# Patient Record
Sex: FEMALE | Race: WHITE | NOT HISPANIC OR LATINO | Employment: OTHER | URBAN - METROPOLITAN AREA
[De-identification: names, ages, dates, MRNs, and addresses within clinical notes are randomized per-mention and may not be internally consistent; named-entity substitution may affect disease eponyms.]

---

## 2017-05-31 PROBLEM — R94.39 ABNORMAL STRESS TEST: Status: ACTIVE | Noted: 2017-05-31

## 2017-08-02 PROBLEM — R94.39 ABNORMAL STRESS TEST: Status: RESOLVED | Noted: 2017-05-31 | Resolved: 2017-08-02

## 2019-01-17 ENCOUNTER — TELEPHONE (OUTPATIENT)
Dept: NEUROLOGY | Facility: CLINIC | Age: 69
End: 2019-01-17

## 2019-01-17 NOTE — TELEPHONE ENCOUNTER
----- Message from Ashley Cain sent at 1/17/2019  1:59 PM CST -----  Type:  Sooner Apoointment Request    Caller is requesting a sooner appointment.  Caller declined first available appointment listed below.  Caller will not accept being placed on the waitlist and is requesting a message be sent to doctor.    Name of Caller:  Patient  When is the first available appointment?  None and darrick ran out  Symptoms:  Dizziness  Best Call Back Number:  [phone]  Additional Information:  Was referred to office by [facility]. Please call to advise.

## 2019-01-25 ENCOUNTER — TELEPHONE (OUTPATIENT)
Dept: NEUROLOGY | Facility: CLINIC | Age: 69
End: 2019-01-25

## 2019-01-25 NOTE — TELEPHONE ENCOUNTER
----- Message from Beatris Lopez sent at 1/25/2019  4:01 PM CST -----  Contact: Amelie with [facility] - Dr. Cullen  Type: Needs Medical Advice    Who Called:  Amelie with Dr. Cullen's office ([facility])    Best Call Back Number: [phone]  Additional Information: Dr. Cullen would like patient seen by Dr. Montes within the next 2 weeks, if possible.  Patient currently has an appointment scheduled on March 21, 2019.  Caller states that Dr. Cullen's notes are available on Epic if Dr. Montes would like to review them.    Please call to advise  Thank you

## 2019-01-25 NOTE — TELEPHONE ENCOUNTER
Called the pt, unable to reach, there was a cancellation and I  was able to schedule her for Feb 5th at 8:00 am. I sent her a message through the pt portal informed of the date and time.

## 2019-02-05 ENCOUNTER — OFFICE VISIT (OUTPATIENT)
Dept: NEUROLOGY | Facility: CLINIC | Age: 69
End: 2019-02-05
Payer: MEDICARE

## 2019-02-05 VITALS
RESPIRATION RATE: 18 BRPM | SYSTOLIC BLOOD PRESSURE: 145 MMHG | BODY MASS INDEX: 30.07 KG/M2 | HEIGHT: 62 IN | HEART RATE: 67 BPM | WEIGHT: 163.38 LBS | DIASTOLIC BLOOD PRESSURE: 70 MMHG

## 2019-02-05 DIAGNOSIS — Q21.12 PFO (PATENT FORAMEN OVALE): ICD-10-CM

## 2019-02-05 DIAGNOSIS — E78.00 HYPERCHOLESTEROLEMIA: ICD-10-CM

## 2019-02-05 DIAGNOSIS — I10 ESSENTIAL HYPERTENSION: ICD-10-CM

## 2019-02-05 DIAGNOSIS — I63.411 CEREBROVASCULAR ACCIDENT (CVA) DUE TO EMBOLISM OF RIGHT MIDDLE CEREBRAL ARTERY: Primary | ICD-10-CM

## 2019-02-05 DIAGNOSIS — E11.65 TYPE 2 DIABETES MELLITUS WITH HYPERGLYCEMIA, WITHOUT LONG-TERM CURRENT USE OF INSULIN: ICD-10-CM

## 2019-02-05 DIAGNOSIS — Z86.73 HISTORY OF CVA (CEREBROVASCULAR ACCIDENT): ICD-10-CM

## 2019-02-05 PROCEDURE — 99205 OFFICE O/P NEW HI 60 MIN: CPT | Mod: S$PBB,,, | Performed by: PSYCHIATRY & NEUROLOGY

## 2019-02-05 PROCEDURE — 99205 PR OFFICE/OUTPT VISIT, NEW, LEVL V, 60-74 MIN: ICD-10-PCS | Mod: S$PBB,,, | Performed by: PSYCHIATRY & NEUROLOGY

## 2019-02-05 PROCEDURE — 99999 PR PBB SHADOW E&M-EST. PATIENT-LVL IV: ICD-10-PCS | Mod: PBBFAC,,, | Performed by: PSYCHIATRY & NEUROLOGY

## 2019-02-05 PROCEDURE — 99999 PR PBB SHADOW E&M-EST. PATIENT-LVL IV: CPT | Mod: PBBFAC,,, | Performed by: PSYCHIATRY & NEUROLOGY

## 2019-02-05 PROCEDURE — 99214 OFFICE O/P EST MOD 30 MIN: CPT | Mod: PBBFAC,PN | Performed by: PSYCHIATRY & NEUROLOGY

## 2019-02-05 RX ORDER — NAPROXEN SODIUM 220 MG/1
81 TABLET, FILM COATED ORAL DAILY
Refills: 0 | COMMUNITY
Start: 2019-02-05 | End: 2021-07-02

## 2019-02-05 NOTE — PATIENT INSTRUCTIONS
-Continue on the plavix 75 mg daily for now, add aspirin 81 mg daily (baby aspirin)    -Blood work to look for plavix resistance  -MRA of the brain and neck to look at your blood vessels  -Repeat echocardiogram of the heart
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
## 2019-02-05 NOTE — LETTER
February 5, 2019      [facility]  [address] [facility] [address]           [place] [facility]  [address] [facility] [address] [place] [address]  Phone: [phone]  Fax: [phone]          Patient: Anne Roger   MR Number: [id number]   YOB: 1950   Date of Visit: 2/5/2019       Dear [facility]:    Thank you for referring Anne Roger to me for evaluation. Attached you will find relevant portions of my assessment and plan of care.    If you have questions, please do not hesitate to call me. I look forward to following Anne Roger along with you.    Sincerely,    Jackelin Montes MD    Enclosure  CC:  No Recipients    If you would like to receive this communication electronically, please contact [email or web address] or [phone] to request more information on [employer] access.    For providers and/or their staff who would like to refer a patient to [facility], please contact us through our one-stop-shop provider referral line, Murphy Santiago, at [phone].    If you feel you have received this communication in error or would no longer like to receive these types of communications, please e-mail [email or web address]

## 2019-02-08 ENCOUNTER — TELEPHONE (OUTPATIENT)
Dept: NEUROLOGY | Facility: CLINIC | Age: 69
End: 2019-02-08

## 2019-02-08 ENCOUNTER — CLINICAL SUPPORT (OUTPATIENT)
Dept: CARDIOLOGY | Facility: CLINIC | Age: 69
End: 2019-02-08
Attending: PSYCHIATRY & NEUROLOGY
Payer: MEDICARE

## 2019-02-08 ENCOUNTER — LAB VISIT (OUTPATIENT)
Dept: LAB | Facility: HOSPITAL | Age: 69
End: 2019-02-08
Attending: PSYCHIATRY & NEUROLOGY
Payer: MEDICARE

## 2019-02-08 VITALS
HEART RATE: 65 BPM | WEIGHT: 163 LBS | BODY MASS INDEX: 30 KG/M2 | HEIGHT: 62 IN | SYSTOLIC BLOOD PRESSURE: 118 MMHG | DIASTOLIC BLOOD PRESSURE: 60 MMHG

## 2019-02-08 DIAGNOSIS — I63.411 CEREBROVASCULAR ACCIDENT (CVA) DUE TO EMBOLISM OF RIGHT MIDDLE CEREBRAL ARTERY: ICD-10-CM

## 2019-02-08 DIAGNOSIS — Z86.73 HISTORY OF CVA (CEREBROVASCULAR ACCIDENT): Primary | ICD-10-CM

## 2019-02-08 LAB
ASCENDING AORTA: 3.27 CM
BSA FOR ECHO PROCEDURE: 1.8 M2
CV ECHO LV RWT: 0.46 CM
DOP CALC LVOT AREA: 3.87 CM2
DOP CALC LVOT DIAMETER: 2.22 CM
DOP CALC LVOT PEAK VEL: 0.86 M/S
DOP CALC LVOT STROKE VOLUME: 68.36 CM3
DOP CALCLVOT PEAK VEL VTI: 17.67 CM
E WAVE DECELERATION TIME: 166.9 MSEC
E/A RATIO: 0.76
E/E' RATIO: 16.25
ECHO LV POSTERIOR WALL: 0.89 CM (ref 0.6–1.1)
FRACTIONAL SHORTENING: 29 % (ref 28–44)
INTERVENTRICULAR SEPTUM: 1.02 CM (ref 0.6–1.1)
IVRT: 0.08 MSEC
LA MAJOR: 4.33 CM
LA MINOR: 3.91 CM
LA WIDTH: 3.06 CM
LEFT ATRIUM SIZE: 3.54 CM
LEFT ATRIUM VOLUME INDEX: 21.6 ML/M2
LEFT ATRIUM VOLUME: 37.84 CM3
LEFT INTERNAL DIMENSION IN SYSTOLE: 2.75 CM (ref 2.1–4)
LEFT VENTRICLE DIASTOLIC VOLUME INDEX: 36.72 ML/M2
LEFT VENTRICLE DIASTOLIC VOLUME: 64.34 ML
LEFT VENTRICLE MASS INDEX: 64.2 G/M2
LEFT VENTRICLE SYSTOLIC VOLUME INDEX: 16.1 ML/M2
LEFT VENTRICLE SYSTOLIC VOLUME: 28.18 ML
LEFT VENTRICULAR INTERNAL DIMENSION IN DIASTOLE: 3.86 CM (ref 3.5–6)
LEFT VENTRICULAR MASS: 112.58 G
LV LATERAL E/E' RATIO: 13
LV SEPTAL E/E' RATIO: 21.67
MV PEAK A VEL: 0.85 M/S
MV PEAK E VEL: 0.65 M/S
PISA TR MAX VEL: 2.64 M/S
PULM VEIN S/D RATIO: 1.5
PV PEAK D VEL: 0.4 M/S
PV PEAK S VEL: 0.6 M/S
RA MAJOR: 4.49 CM
RA PRESSURE: 3 MMHG
RA WIDTH: 2.62 CM
RIGHT VENTRICULAR END-DIASTOLIC DIMENSION: 2.77 CM
SINUS: 2.72 CM
STJ: 3.11 CM
TDI LATERAL: 0.05
TDI SEPTAL: 0.03
TDI: 0.04
TR MAX PG: 27.88 MMHG
TRICUSPID ANNULAR PLANE SYSTOLIC EXCURSION: 2.01 CM
TV REST PULMONARY ARTERY PRESSURE: 31 MMHG

## 2019-02-08 PROCEDURE — 99999 PR PBB SHADOW E&M-EST. PATIENT-LVL II: ICD-10-PCS | Mod: PBBFAC,,,

## 2019-02-08 PROCEDURE — 93306 TRANSTHORACIC ECHO (TTE) COMPLETE (CUPID ONLY): ICD-10-PCS | Mod: 26,S$PBB,, | Performed by: INTERNAL MEDICINE

## 2019-02-08 PROCEDURE — 99212 OFFICE O/P EST SF 10 MIN: CPT | Mod: PBBFAC,PO,25

## 2019-02-08 PROCEDURE — 93306 TTE W/DOPPLER COMPLETE: CPT | Mod: PBBFAC,PO | Performed by: INTERNAL MEDICINE

## 2019-02-08 PROCEDURE — 99999 PR PBB SHADOW E&M-EST. PATIENT-LVL II: CPT | Mod: PBBFAC,,,

## 2019-02-08 NOTE — TELEPHONE ENCOUNTER
----- Message from Hedy Vargas sent at 2/8/2019  9:26 AM CST -----  Contact: Patient  Patient is calling to speak with the office regarding her Plavix lab that she was trying to have done this morning.  The lab told her that they did not have a kit to do it.  She is trying to find out if she can go to the [facility] to have it done and needs the orders put back in the system.  Please call patient to discuss.  Call Back#[phone]  Thanks

## 2019-02-11 ENCOUNTER — TELEPHONE (OUTPATIENT)
Dept: NEUROLOGY | Facility: CLINIC | Age: 69
End: 2019-02-11

## 2019-02-11 NOTE — TELEPHONE ENCOUNTER
----- Message from Toni Higgins sent at 2/11/2019 12:51 PM CST -----  Contact: Patient  Type: Needs Medical Advice    Who Called:  Patient  Best Call Back Number: [phone]  Additional Information: Patient needs to schedule Platelet Response Plavix lab ordered by Jackelin Montes. Please advise patient where she can have this done and schedule lab for her

## 2019-02-12 ENCOUNTER — PATIENT MESSAGE (OUTPATIENT)
Dept: NEUROLOGY | Facility: CLINIC | Age: 69
End: 2019-02-12

## 2019-02-19 ENCOUNTER — LAB VISIT (OUTPATIENT)
Dept: LAB | Facility: HOSPITAL | Age: 69
End: 2019-02-19
Attending: PSYCHIATRY & NEUROLOGY
Payer: MEDICARE

## 2019-02-19 DIAGNOSIS — Z86.73 HISTORY OF CVA (CEREBROVASCULAR ACCIDENT): ICD-10-CM

## 2019-02-19 LAB — PLATELET RESPONSE PLAVIX: 149 PRU

## 2019-02-19 PROCEDURE — 36415 COLL VENOUS BLD VENIPUNCTURE: CPT

## 2019-02-19 PROCEDURE — 85576 BLOOD PLATELET AGGREGATION: CPT

## 2019-02-25 ENCOUNTER — HOSPITAL ENCOUNTER (OUTPATIENT)
Dept: RADIOLOGY | Facility: HOSPITAL | Age: 69
Discharge: HOME | End: 2019-02-25
Attending: PSYCHIATRY & NEUROLOGY
Payer: MEDICARE

## 2019-02-25 DIAGNOSIS — I63.411 CEREBROVASCULAR ACCIDENT (CVA) DUE TO EMBOLISM OF RIGHT MIDDLE CEREBRAL ARTERY: ICD-10-CM

## 2019-02-25 PROCEDURE — 70549 MR ANGIOGRAPH NECK W/O&W/DYE: CPT | Mod: 26,,, | Performed by: RADIOLOGY

## 2019-02-25 PROCEDURE — 70544 MR ANGIOGRAPHY HEAD W/O DYE: CPT | Mod: TC,PO

## 2019-02-25 PROCEDURE — 70544 MRA BRAIN WITHOUT CONTRAST: ICD-10-PCS | Mod: 26,,, | Performed by: RADIOLOGY

## 2019-02-25 PROCEDURE — 70544 MR ANGIOGRAPHY HEAD W/O DYE: CPT | Mod: 26,,, | Performed by: RADIOLOGY

## 2019-02-25 PROCEDURE — A9577 INJ MULTIHANCE: HCPCS | Mod: PO | Performed by: PSYCHIATRY & NEUROLOGY

## 2019-02-25 PROCEDURE — 70549 MRA NECK WITH AND WITHOUT: ICD-10-PCS | Mod: 26,,, | Performed by: RADIOLOGY

## 2019-02-25 PROCEDURE — 70549 MR ANGIOGRAPH NECK W/O&W/DYE: CPT | Mod: TC,PO

## 2019-02-25 PROCEDURE — 25500020 PHARM REV CODE 255: Mod: PO | Performed by: PSYCHIATRY & NEUROLOGY

## 2019-02-25 RX ADMIN — GADOBENATE DIMEGLUMINE 14 ML: 529 INJECTION, SOLUTION INTRAVENOUS at 09:02

## 2019-03-01 ENCOUNTER — OFFICE VISIT (OUTPATIENT)
Dept: NEUROLOGY | Facility: CLINIC | Age: 69
End: 2019-03-01
Payer: MEDICARE

## 2019-03-01 VITALS
HEART RATE: 69 BPM | RESPIRATION RATE: 20 BRPM | SYSTOLIC BLOOD PRESSURE: 139 MMHG | WEIGHT: 165 LBS | HEIGHT: 62 IN | DIASTOLIC BLOOD PRESSURE: 60 MMHG | BODY MASS INDEX: 30.36 KG/M2

## 2019-03-01 DIAGNOSIS — I10 ESSENTIAL HYPERTENSION: ICD-10-CM

## 2019-03-01 DIAGNOSIS — Z86.73 HISTORY OF STROKE: Primary | ICD-10-CM

## 2019-03-01 DIAGNOSIS — Q21.12 PFO (PATENT FORAMEN OVALE): ICD-10-CM

## 2019-03-01 DIAGNOSIS — E11.65 TYPE 2 DIABETES MELLITUS WITH HYPERGLYCEMIA, WITHOUT LONG-TERM CURRENT USE OF INSULIN: ICD-10-CM

## 2019-03-01 DIAGNOSIS — E78.00 HYPERCHOLESTEROLEMIA: ICD-10-CM

## 2019-03-01 PROCEDURE — 99999 PR PBB SHADOW E&M-EST. PATIENT-LVL III: CPT | Mod: PBBFAC,,, | Performed by: PSYCHIATRY & NEUROLOGY

## 2019-03-01 PROCEDURE — 99213 OFFICE O/P EST LOW 20 MIN: CPT | Mod: PBBFAC,PN | Performed by: PSYCHIATRY & NEUROLOGY

## 2019-03-01 PROCEDURE — 99214 PR OFFICE/OUTPT VISIT, EST, LEVL IV, 30-39 MIN: ICD-10-PCS | Mod: S$PBB,,, | Performed by: PSYCHIATRY & NEUROLOGY

## 2019-03-01 PROCEDURE — 99999 PR PBB SHADOW E&M-EST. PATIENT-LVL III: ICD-10-PCS | Mod: PBBFAC,,, | Performed by: PSYCHIATRY & NEUROLOGY

## 2019-03-01 PROCEDURE — 99214 OFFICE O/P EST MOD 30 MIN: CPT | Mod: S$PBB,,, | Performed by: PSYCHIATRY & NEUROLOGY
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
## 2019-03-12 ENCOUNTER — TELEPHONE (OUTPATIENT)
Dept: NEUROLOGY | Facility: CLINIC | Age: 69
End: 2019-03-12

## 2019-03-12 NOTE — TELEPHONE ENCOUNTER
Spoke with pt and notified her of Dr. Montes medication recommendations; pt verbalized understanding.

## 2019-03-12 NOTE — TELEPHONE ENCOUNTER
Platelet response to plavix is low indicating it is not as effective for her for stroke prevention. Recommend she continue on aspirin 81 mg daily and discontinue the plavix

## 2019-06-20 ENCOUNTER — OFFICE VISIT (OUTPATIENT)
Dept: NEUROLOGY | Facility: CLINIC | Age: 69
End: 2019-06-20
Payer: MEDICARE

## 2019-06-20 VITALS
RESPIRATION RATE: 20 BRPM | SYSTOLIC BLOOD PRESSURE: 147 MMHG | HEART RATE: 62 BPM | HEIGHT: 62 IN | DIASTOLIC BLOOD PRESSURE: 67 MMHG | BODY MASS INDEX: 30.12 KG/M2 | WEIGHT: 163.69 LBS

## 2019-06-20 DIAGNOSIS — Q21.12 PFO (PATENT FORAMEN OVALE): ICD-10-CM

## 2019-06-20 DIAGNOSIS — I10 ESSENTIAL HYPERTENSION: ICD-10-CM

## 2019-06-20 DIAGNOSIS — Z78.9 PLAVIX RESISTANCE: ICD-10-CM

## 2019-06-20 DIAGNOSIS — Z86.73 HISTORY OF TIA (TRANSIENT ISCHEMIC ATTACK): ICD-10-CM

## 2019-06-20 DIAGNOSIS — Z86.73 HISTORY OF CVA (CEREBROVASCULAR ACCIDENT): Primary | ICD-10-CM

## 2019-06-20 DIAGNOSIS — E78.00 HYPERCHOLESTEROLEMIA: ICD-10-CM

## 2019-06-20 PROCEDURE — 99214 PR OFFICE/OUTPT VISIT, EST, LEVL IV, 30-39 MIN: ICD-10-PCS | Mod: S$PBB,,, | Performed by: PSYCHIATRY & NEUROLOGY

## 2019-06-20 PROCEDURE — 99214 OFFICE O/P EST MOD 30 MIN: CPT | Mod: S$PBB,,, | Performed by: PSYCHIATRY & NEUROLOGY

## 2019-06-20 PROCEDURE — 99999 PR PBB SHADOW E&M-EST. PATIENT-LVL III: CPT | Mod: PBBFAC,,, | Performed by: PSYCHIATRY & NEUROLOGY

## 2019-06-20 PROCEDURE — 99213 OFFICE O/P EST LOW 20 MIN: CPT | Mod: PBBFAC,PN | Performed by: PSYCHIATRY & NEUROLOGY

## 2019-06-20 PROCEDURE — 99999 PR PBB SHADOW E&M-EST. PATIENT-LVL III: ICD-10-PCS | Mod: PBBFAC,,, | Performed by: PSYCHIATRY & NEUROLOGY
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
## 2019-12-05 ENCOUNTER — TELEPHONE (OUTPATIENT)
Dept: NEUROLOGY | Facility: CLINIC | Age: 69
End: 2019-12-05

## 2019-12-05 NOTE — TELEPHONE ENCOUNTER
Spoke with patient and gave message from Dr. Montes.         Per Dr. Montes -   The migraine could have been related to the high blood pressure. She should work with her PCP for BP control and the compazine is fine to take if she does get another migraine. If the migraines are becoming more and more frequent (occurring more than two days per week), we can talk about other treatment options.

## 2019-12-05 NOTE — TELEPHONE ENCOUNTER
----- Message from Jessica Hope sent at 12/5/2019  8:54 AM CST -----  Type:  Same Day Appointment Request    Caller is requesting a same day appointment.      Name of Caller: Patient  When is the first available appointment?  NA  Symptoms:  Headache  Best Call Back Number:  [phone]  Additional Information:   Went to [facility] Emergency room last night/diagnosed with high blood pressure and migraine

## 2019-12-05 NOTE — TELEPHONE ENCOUNTER
Spoke with patient. She stated she went to ER last night and was diagnosed with high BP and migraine. No current migraine symptoms. Stated they gave her medication in the hospital and sent her home with a prescription for Compazine 10mg which she is going to  today. Explained to patient that Dr. Montes is not in clinic and that she could not be scheduled to see someone today. She stated she is fine not following up right now but would like advice on where to go from here if not following up in clinic soon. Please advise.

## 2020-12-21 ENCOUNTER — PATIENT MESSAGE (OUTPATIENT)
Dept: OTHER | Facility: OTHER | Age: 70
End: 2020-12-21

## 2021-01-06 ENCOUNTER — PATIENT MESSAGE (OUTPATIENT)
Dept: FAMILY MEDICINE | Facility: CLINIC | Age: 71
End: 2021-01-06

## 2021-01-07 ENCOUNTER — PATIENT MESSAGE (OUTPATIENT)
Dept: ADMINISTRATIVE | Facility: OTHER | Age: 71
End: 2021-01-07

## 2021-01-09 ENCOUNTER — IMMUNIZATION (OUTPATIENT)
Dept: FAMILY MEDICINE | Facility: CLINIC | Age: 71
End: 2021-01-09
Payer: MEDICARE

## 2021-01-09 DIAGNOSIS — Z23 NEED FOR VACCINATION: ICD-10-CM

## 2021-01-09 PROCEDURE — 91300 COVID-19, MRNA, LNP-S, PF, 30 MCG/0.3 ML DOSE VACCINE: CPT | Mod: PBBFAC,PO

## 2021-01-30 ENCOUNTER — IMMUNIZATION (OUTPATIENT)
Dept: FAMILY MEDICINE | Facility: CLINIC | Age: 71
End: 2021-01-30
Payer: MEDICARE

## 2021-01-30 DIAGNOSIS — Z23 NEED FOR VACCINATION: Primary | ICD-10-CM

## 2021-01-30 PROCEDURE — 91300 COVID-19, MRNA, LNP-S, PF, 30 MCG/0.3 ML DOSE VACCINE: CPT | Mod: PBBFAC,PO

## 2021-01-30 PROCEDURE — 0002A COVID-19, MRNA, LNP-S, PF, 30 MCG/0.3 ML DOSE VACCINE: CPT | Mod: PBBFAC,PO

## 2021-06-30 ENCOUNTER — OFFICE VISIT (OUTPATIENT)
Dept: OBSTETRICS AND GYNECOLOGY | Facility: CLINIC | Age: 71
End: 2021-06-30
Payer: MEDICARE

## 2021-06-30 VITALS — DIASTOLIC BLOOD PRESSURE: 62 MMHG | SYSTOLIC BLOOD PRESSURE: 95 MMHG | BODY MASS INDEX: 29.63 KG/M2 | WEIGHT: 162 LBS

## 2021-06-30 DIAGNOSIS — Z01.419 ENCOUNTER FOR ANNUAL ROUTINE GYNECOLOGICAL EXAMINATION: Primary | ICD-10-CM

## 2021-06-30 PROCEDURE — G0101 PR CA SCREEN;PELVIC/BREAST EXAM: ICD-10-PCS | Mod: S$PBB,,, | Performed by: OBSTETRICS & GYNECOLOGY

## 2021-06-30 PROCEDURE — G0101 CA SCREEN;PELVIC/BREAST EXAM: HCPCS | Mod: S$PBB,,, | Performed by: OBSTETRICS & GYNECOLOGY

## 2021-06-30 PROCEDURE — 99213 OFFICE O/P EST LOW 20 MIN: CPT | Mod: PBBFAC,PN | Performed by: OBSTETRICS & GYNECOLOGY

## 2021-06-30 PROCEDURE — 99999 PR PBB SHADOW E&M-EST. PATIENT-LVL III: ICD-10-PCS | Mod: PBBFAC,,, | Performed by: OBSTETRICS & GYNECOLOGY

## 2021-06-30 PROCEDURE — 99999 PR PBB SHADOW E&M-EST. PATIENT-LVL III: CPT | Mod: PBBFAC,,, | Performed by: OBSTETRICS & GYNECOLOGY

## 2021-06-30 RX ORDER — ALENDRONATE SODIUM 70 MG/1
70 TABLET ORAL
Qty: 12 TABLET | Refills: 4 | Status: SHIPPED | OUTPATIENT
Start: 2021-06-30 | End: 2022-08-15

## 2021-07-01 ENCOUNTER — TELEPHONE (OUTPATIENT)
Dept: OBSTETRICS AND GYNECOLOGY | Facility: CLINIC | Age: 71
End: 2021-07-01

## 2021-10-07 ENCOUNTER — IMMUNIZATION (OUTPATIENT)
Dept: FAMILY MEDICINE | Facility: CLINIC | Age: 71
End: 2021-10-07
Payer: MEDICARE

## 2021-10-07 DIAGNOSIS — Z23 NEED FOR VACCINATION: Primary | ICD-10-CM

## 2021-10-07 PROCEDURE — 0003A COVID-19, MRNA, LNP-S, PF, 30 MCG/0.3 ML DOSE VACCINE: CPT | Mod: PBBFAC | Performed by: FAMILY MEDICINE

## 2021-10-07 PROCEDURE — 91300 COVID-19, MRNA, LNP-S, PF, 30 MCG/0.3 ML DOSE VACCINE: CPT | Mod: PBBFAC,PO

## 2021-12-31 ENCOUNTER — PATIENT MESSAGE (OUTPATIENT)
Dept: ADMINISTRATIVE | Facility: OTHER | Age: 71
End: 2021-12-31
Payer: MEDICARE

## 2022-02-18 ENCOUNTER — PATIENT OUTREACH (OUTPATIENT)
Dept: ADMINISTRATIVE | Facility: HOSPITAL | Age: 72
End: 2022-02-18
Payer: MEDICARE

## 2022-02-18 NOTE — PROGRESS NOTES
[facility] chart audits-DEPRESSION SCREEN/FOLLOW UP PLAN.    SCREENING:  NO PHQ9 SCREENING FOR MEASUREMENT PERIOD.

## 2022-08-18 ENCOUNTER — TELEPHONE (OUTPATIENT)
Dept: ORTHOPEDICS | Facility: CLINIC | Age: 72
End: 2022-08-18
Payer: MEDICARE

## 2022-08-18 PROBLEM — M18.12 PRIMARY OSTEOARTHRITIS OF FIRST CARPOMETACARPAL JOINT OF LEFT HAND: Status: ACTIVE | Noted: 2022-08-18

## 2022-08-18 PROBLEM — E11.59 TYPE 2 DIABETES MELLITUS WITH CIRCULATORY DISORDER, WITHOUT LONG-TERM CURRENT USE OF INSULIN: Status: ACTIVE | Noted: 2022-08-18

## 2022-08-19 ENCOUNTER — TELEPHONE (OUTPATIENT)
Dept: ORTHOPEDICS | Facility: CLINIC | Age: 72
End: 2022-08-19
Payer: MEDICARE

## 2022-08-30 ENCOUNTER — TELEPHONE (OUTPATIENT)
Dept: OBSTETRICS AND GYNECOLOGY | Facility: CLINIC | Age: 72
End: 2022-08-30
Payer: MEDICARE

## 2022-08-30 NOTE — TELEPHONE ENCOUNTER
----- Message from Marquise Nguyễn MD sent at 8/30/2022  8:30 AM CDT -----  DEXA shows osteopenia with slight increase in bone density.  Continue with Fosamax as long as improving

## 2022-09-12 ENCOUNTER — HOSPITAL ENCOUNTER (OUTPATIENT)
Dept: RADIOLOGY | Facility: HOSPITAL | Age: 72
Discharge: HOME | End: 2022-09-12
Attending: ORTHOPAEDIC SURGERY
Payer: MEDICARE

## 2022-09-12 ENCOUNTER — OFFICE VISIT (OUTPATIENT)
Dept: ORTHOPEDICS | Facility: CLINIC | Age: 72
End: 2022-09-12
Payer: MEDICARE

## 2022-09-12 DIAGNOSIS — M18.12 PRIMARY OSTEOARTHRITIS OF FIRST CARPOMETACARPAL JOINT OF LEFT HAND: ICD-10-CM

## 2022-09-12 PROCEDURE — 99203 OFFICE O/P NEW LOW 30 MIN: CPT | Mod: S$PBB,,, | Performed by: ORTHOPAEDIC SURGERY

## 2022-09-12 PROCEDURE — 73130 X-RAY EXAM OF HAND: CPT | Mod: 26,LT,, | Performed by: RADIOLOGY

## 2022-09-12 PROCEDURE — 73130 X-RAY EXAM OF HAND: CPT | Mod: TC,PO,LT

## 2022-09-12 PROCEDURE — 99203 PR OFFICE/OUTPT VISIT, NEW, LEVL III, 30-44 MIN: ICD-10-PCS | Mod: S$PBB,,, | Performed by: ORTHOPAEDIC SURGERY

## 2022-09-12 PROCEDURE — 99213 OFFICE O/P EST LOW 20 MIN: CPT | Mod: PBBFAC,PN | Performed by: ORTHOPAEDIC SURGERY

## 2022-09-12 PROCEDURE — 99999 PR PBB SHADOW E&M-EST. PATIENT-LVL III: ICD-10-PCS | Mod: PBBFAC,,, | Performed by: ORTHOPAEDIC SURGERY

## 2022-09-12 PROCEDURE — 73130 XR HAND COMPLETE 3 VIEW LEFT: ICD-10-PCS | Mod: 26,LT,, | Performed by: RADIOLOGY

## 2022-09-12 PROCEDURE — 99999 PR PBB SHADOW E&M-EST. PATIENT-LVL III: CPT | Mod: PBBFAC,,, | Performed by: ORTHOPAEDIC SURGERY

## 2022-09-12 NOTE — PROGRESS NOTES
"9/12/2022    Chief Complaint:  Chief Complaint   Patient presents with    Left Hand - Pain     Decreased strength    Arthritis       HPI:  Anne Roger is a 71 y.o. female, who presents to clinic today she has a history of pain at the base of her left thumb.  She states that this has been going on for months.  It is gradually getting worse.  She is noticing decrease in her  strength.  She has not had any recent injuries.  She has no other complaints currently.    PMHX:  Past Medical History:   Diagnosis Date    a Coronary Artery Disease     Dr. Pepe Lisa; 5/31/17 LHC/Angiogram = (See Report); On Medical Management Only    a Patent Foramen Ovale With Atrial Septal Aneurym ####    Dr. Pepe Lisa; Dr. Jackelin Montes; In 2016 Her Insurance Denied Coverage For Her Recommended PFO Closure Procedure    b Hypertension     1/18/19 Increased Losartan 12.5 Mg To 25 Mg Daily    c Hypercholesterolemia     d Type 2 Diabetes Mellitus     12/13/19 Referred To Dr. Radha Dietz; 6/10/19 Increased Metformin-XR To 750 Mg BID; On Metformin- Mg BID And Amaryl 2 Mg BID    e Hypothyroidism     f Osteopenia (Improved From Previous Osteoporosis)     On Fosamax 70 Mg qWeek, And OTC D3 2K IU Daily, And OTC CaCO3 500 Mg BID    i SANAM With Hypersomnia     Dr. Chari Caal; Patient Prefers A Dental Appliance    j H/O Adenomatous Colon Polyp On Previous TC ###    Dr. Donn Lopez (After Her 8/19/19 TC Was Free Of Polyps): "Repeat TC In 5 Yrs"    k Nocturia     09/2014 Referred To Dr. Kyler Eller     l Bilateral Plantar Fasciitis     Dr. Charan Godoy ([facility]) Last Saw Her On 1/4/17    l Hallux Limitus Right Foot > Left Foot     Dr. Charan Godoy ([facility]) Last Saw Her On 1/4/17    l Left Shoulder Arthropathy     l Left Thumb Base Osteoarthritis     8/18/22 Referred To Dr. Jhonathan Nance; 8/18/22 RXd Voltaren Topical    l Right Shoulder Pain With Inflammation On 11/14 MRI     Dr. Jess Fox; Received PT For This 11/2014    m Bilateral " Leg Paresthesias ###    Dr. Jackelin Montes; 1/22/19 Brain MRI W/O Contrast = (See Report); 2/25/19 Brain And Neck MRA = Unremarkable (See Reports)    m Chronic Fatigue     10/17/16 CBC, TFTs (On Synthroid), Vitamin B12 Level, And Vitamin D3 Level = Normal;  imporving    m H/O CVA From PFO 04/2014 With No Sequelae ###    Dr. Jackelin Montes; Dr. Naranjo (Neurology); In 2016 Her Insurance Denied Coverage For Her Recommended PFO Closure Procedure    m Migraines     Dr. Jackelin Montes; Infrequent    n Chronic Depression     o Recurrent Dizziness Episodes ###    Dr. Jackelin Montes; 1/22/19 Brain MRI W/O Contrast = (See Report); 2/25/19 Brain And Neck MRA = Unremarkable (See Reports)    o Recurrent sinusitis     1/18/19 Referred To Dr. Perez Somers    p Early Bilateral Cataracts     Dr. 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 MORAIMA Marroquin    q BLE Varicose Veins     q Borderline Vitamin D Deficiency     12/13/19 RXd OTC D3 2K IU Daily    Wellness Visit 8/18/2022        PSHX:  Past Surgical History:   Procedure Laterality Date    DILATION AND CURETTAGE OF UTERUS      HYSTERECTOMY      OOPHORECTOMY      TONSILLECTOMY         FMHX:  Family History   Problem Relation Age of Onset    Depression Other     Diabetes Other     Thyroid disease Other     Lung cancer Other     Diabetes Mother     Blindness Mother     Endocrine tumor Mother     Lung cancer Father     Cancer Father         lung and brain    Anxiety disorder Sister     Deafness Sister     Deafness Brother     Cancer Sister         bladder    Valvular heart disease Sister        SOCHX:  Social History     Tobacco Use    Smoking status: Never    Smokeless tobacco: Never   Substance Use Topics    Alcohol use: Yes     Alcohol/week: 1.0 standard drink     Types: 1 Glasses of wine per week     Comment: occasionally       ALLERGIES:  Codeine, Plavix [clopidogrel], and Ramipril    CURRENT MEDICATIONS:  Current Outpatient Medications on File Prior to Visit   Medication Sig  Dispense Refill    alendronate (FOSAMAX) 70 MG tablet TAKE 1 TABLET BY MOUTH EVERY 7 DAYS 12 tablet 0    aspirin (ECOTRIN) 81 MG EC tablet Take 1 tablet (81 mg total) by mouth once daily.  0    atorvastatin (LIPITOR) 40 MG tablet Take 1 tablet (40 mg total) by mouth every evening. 90 tablet 3    blood sugar diagnostic (CONTOUR NEXT TEST STRIPS) Strp 1 each by Other route before breakfast. For diagnosis code E11.65 100 each 3    calcium-vitamin D3 (OS-JAYLIN 500 + D3) 500 mg(1,250mg) -200 unit per tablet Take 1 tablet by mouth 2 (two) times daily with meals. OSCAL 500/200 D-3 500-200 MG-UNIT TABS      cholecalciferol, vitamin D3, (VITAMIN D3) 2,000 unit Tab Take 1 tablet (2,000 Units total) by mouth once daily.      diabetic supplies, miscellan. Misc MICROLET LANCETS MISC      diclofenac sodium (VOLTAREN) 1 % Gel Apply 2 g topically 3 (three) times daily as needed. 300 g 3    fluorouraciL (EFUDEX) 5 % cream APPLY TOPICALLY TO AFFECTED AREA ON THE RIGHT ARM 1 TO 2 TIMES PER DAY AS TOLERATED      FOLIC ACID/MULTIVIT-MIN/LUTEIN (CENTRUM SILVER ORAL) Take 1 tablet by mouth nightly.       glimepiride (AMARYL) 2 MG tablet TAKE 1 & 1/2 (ONE & ONE-HALF) TABLETS BY MOUTH TWICE DAILY 270 tablet 3    GLUC HCL/GLUC HUGHES/YC-ZBC-W-GLUC (GLUCOSAMINE COMPLEX ORAL) Take 1 tablet by mouth once daily at 6am.       KELP ORAL Take 1 tablet by mouth once daily at 6am. KELP TABS      lancets (MICROLET LANCET) Misc USE 1  TO CHECK GLUCOSE TWICE DAILY 100 each 3    levothyroxine (SYNTHROID) 50 MCG tablet Take 1 tablet (50 mcg total) by mouth every morning. 90 tablet 3    losartan (COZAAR) 100 MG tablet Take 1 tablet (100 mg total) by mouth once daily. 90 tablet 3    magnesium 30 mg Tab Take 1 capsule by mouth nightly. MAGNESIUM TABS      metFORMIN (GLUCOPHAGE-XR) 750 MG ER 24hr tablet Take 1 tablet (750 mg total) by mouth 2 (two) times daily. 180 tablet 3    mupirocin (BACTROBAN) 2 % ointment Apply topically 2 (two) times daily as needed. 30 g 0      No current facility-administered medications on file prior to visit.       REVIEW OF SYSTEMS:  Review of Systems   Constitutional:  Negative for diaphoresis and fever.   HENT:  Negative for ear pain, hearing loss, nosebleeds and tinnitus.    Eyes:  Negative for pain and redness.   Respiratory:  Negative for cough and shortness of breath.    Cardiovascular:  Negative for chest pain and palpitations.   Gastrointestinal:  Negative for blood in stool, constipation, diarrhea, nausea and vomiting.   Genitourinary:  Positive for frequency. Negative for dysuria and hematuria.   Musculoskeletal:  Negative for back pain and myalgias.   Skin:  Negative for itching and rash.   Neurological:  Positive for weakness. Negative for dizziness, tingling, seizures and headaches.   Endo/Heme/Allergies:  Negative for environmental allergies.   Psychiatric/Behavioral:  The patient is nervous/anxious.      GENERAL PHYSICAL EXAM:   There were no vitals taken for this visit.   GEN: well developed, well nourished, no acute distress   HENT: Normocephalic, atraumatic   EYES: No discharge, conjunctiva normal   NECK: Supple, non-tender   PULM: No wheezing, no respiratory distress   CV: RRR   ABD: Soft, non-tender    ORTHO EXAM:   Examination of the left hand and wrist reveals that there is no edema.  There are no skin changes.  Palpation produces mild tenderness over the CMC joint.  Has a mildly to moderately positive grind test.  She is able to flex and extend all fingers.  She does have sensation intact in the median radial ulnar distribution.  Capillary refill is less than 2 seconds.    RADIOLOGY:   X-rays of the left hand were taken in clinic today.  The films reviewed by me.  There is noted to be mild to moderate degenerative change about the thumb CMC joint.  There is no other fracture dislocation or pathology    ASSESSMENT:   Left thumb mild-to-moderate CMC arthritis    PLAN:  1. She does have a prescription for topical  anti-inflammatory.  She has not started taking it yet but she would like to try that 1st    2.  Will provide her with the information for a meta  splint    3.  Follow-up with me on a p.r.n. basis     Answers submitted by the patient for this visit:  Orthopedics Questionnaire (Submitted on 9/11/2022)  unexpected weight change: No  appetite change : No  sleep disturbance: No  IMMUNOCOMPROMISED: No  nervous/ anxious: Yes  dysphoric mood: No  rash: No  visual disturbance: No  eye redness: No  eye pain: No  ear pain: No  tinnitus: No  hearing loss: No  sinus pressure : No  nosebleeds: No  enviro allergies: No  food allergies: No  cough: No  shortness of breath: No  sweating: No  dysuria: No  frequency: Yes  difficulty urinating: No  hematuria: No  painful intercourse: Yes  chest pain: No  palpitations: No  nausea: No  vomiting: No  diarrhea: No  blood in stool: No  constipation: No  headaches: No  dizziness: No  numbness: No  seizures: No  joint swelling: No  myalgia: No  weakness: Yes  back pain: No   (Submitted on 9/11/2022)  Chief Complaint: Hand injury  Pain Chronicity: recurrent  History of trauma: No  Onset: more than 1 month ago  Frequency: intermittently  Progression since onset: unchanged  injury location: at home  pain- numeric: 5/10  pain location: left hand  pain quality: dull, generalized  Radiating Pain: No  Aggravating factors: activity, flexion  fever: No  inability to bear weight: Yes  itching: No  joint locking: No  limited range of motion: Yes  stiffness: No  tingling: No  Treatments tried: exercise, movement, OTC pain meds  physical therapy: not tried  Improvement on treatment: no relief

## 2022-12-16 ENCOUNTER — TELEPHONE (OUTPATIENT)
Dept: UROLOGY | Facility: CLINIC | Age: 72
End: 2022-12-16
Payer: MEDICARE

## 2022-12-16 ENCOUNTER — PATIENT MESSAGE (OUTPATIENT)
Dept: OBSTETRICS AND GYNECOLOGY | Facility: CLINIC | Age: 72
End: 2022-12-16
Payer: MEDICARE

## 2022-12-16 PROBLEM — N81.10 BLADDER PROLAPSE, FEMALE, ACQUIRED: Status: ACTIVE | Noted: 2022-12-16

## 2022-12-16 NOTE — TELEPHONE ENCOUNTER
----- Message from Coreen Marion sent at 12/16/2022 12:37 PM CST -----  Regarding: Appt Access  Contact: self [phone]  Pt is calling to schedule a NP appt.  Referral is in the system.  Next avail is 01/06/2023.  Pt would like to be seen sooner for Bladder prolapse, female, acquired [N81.10].  Please call.

## 2023-01-09 ENCOUNTER — OFFICE VISIT (OUTPATIENT)
Dept: UROLOGY | Facility: CLINIC | Age: 73
End: 2023-01-09
Payer: MEDICARE

## 2023-01-09 VITALS
SYSTOLIC BLOOD PRESSURE: 114 MMHG | BODY MASS INDEX: 28.71 KG/M2 | WEIGHT: 156 LBS | HEIGHT: 62 IN | HEART RATE: 79 BPM | DIASTOLIC BLOOD PRESSURE: 67 MMHG

## 2023-01-09 DIAGNOSIS — N81.10 BLADDER PROLAPSE, FEMALE, ACQUIRED: ICD-10-CM

## 2023-01-09 PROCEDURE — 51701 PR INSERTION OF NON-INDWELLING BLADDER CATHETERIZATION FOR RESIDUAL UR: ICD-10-PCS | Mod: S$PBB,,, | Performed by: UROLOGY

## 2023-01-09 PROCEDURE — 81002 URINALYSIS NONAUTO W/O SCOPE: CPT | Mod: PBBFAC | Performed by: UROLOGY

## 2023-01-09 PROCEDURE — 99204 OFFICE O/P NEW MOD 45 MIN: CPT | Mod: S$PBB,25,, | Performed by: UROLOGY

## 2023-01-09 PROCEDURE — 99999 PR PBB SHADOW E&M-EST. PATIENT-LVL IV: CPT | Mod: PBBFAC,,, | Performed by: UROLOGY

## 2023-01-09 PROCEDURE — 99999 PR PBB SHADOW E&M-EST. PATIENT-LVL IV: ICD-10-PCS | Mod: PBBFAC,,, | Performed by: UROLOGY

## 2023-01-09 PROCEDURE — 99214 OFFICE O/P EST MOD 30 MIN: CPT | Mod: PBBFAC | Performed by: UROLOGY

## 2023-01-09 PROCEDURE — 51701 INSERT BLADDER CATHETER: CPT | Mod: PBBFAC | Performed by: UROLOGY

## 2023-01-09 PROCEDURE — 51701 INSERT BLADDER CATHETER: CPT | Mod: S$PBB,,, | Performed by: UROLOGY

## 2023-01-09 PROCEDURE — 99204 PR OFFICE/OUTPT VISIT, NEW, LEVL IV, 45-59 MIN: ICD-10-PCS | Mod: S$PBB,25,, | Performed by: UROLOGY

## 2023-01-09 NOTE — PROGRESS NOTES
CHIEF COMPLAINT:    Mrs. Roger is a 72 y.o. female presenting for a consultation at the request of Nadira Garcia NP. Patient presents with cystocele.    PRESENTING ILLNESS:    Anne Roger is a 72 y.o. female who is presents with a history of several bladder infections but also has a cystocele.  She has been told by her OBGYN that there was a cystocele present for the past 2 years.  She is bothered by it as she can feel it when it is low.  Sometimes pushes back in when she is in the shower.  She does not need to splint to urinate or defecate.  She has frequency x 8-9, nocturia x 3-4, rarely x 1.  She leans forward to fully empty her bladder.  During her visit, she remarked several times that she did not wish to have surgery on the [facility] as she did not want her  driving and that it would be difficult for them.      , hysterectomy for menorrhagia,  x 50 years, has painful intercourse, none for the past 10 years.  Bowels are normal.  She is still employed at [employer] in [place].     REVIEW OF SYSTEMS:    Review of Systems   Constitutional: Negative.    HENT: Negative.     Eyes: Negative.    Respiratory: Negative.     Cardiovascular: Negative.    Gastrointestinal: Negative.    Genitourinary: Negative.    Musculoskeletal: Negative.    Skin: Negative.    Neurological: Negative.    Endo/Heme/Allergies: Negative.    Psychiatric/Behavioral: Negative.       PATIENT HISTORY:    Past Medical History:   Diagnosis Date    a Coronary Artery Disease     Dr. Pepe Lisa; 17 LHC/Angiogram = (See Report); On Medical Management Only    a Patent Foramen Ovale With Atrial Septal Aneurym ####    Dr. Pepe Lisa; Dr. Jackelin Motnes; In  Her Insurance Denied Coverage For Her Recommended PFO Closure Procedure    b Hypertension     19 Increased Losartan 12.5 Mg To 25 Mg Daily    c Hypercholesterolemia     d Type 2 Diabetes Mellitus     19 Referred To Dr. Radha Dietz; 6/10/19  "Increased Metformin-XR To 750 Mg BID; On Metformin- Mg BID And Amaryl 2 Mg BID    e Hypothyroidism     f Osteopenia (Improved From Previous Osteoporosis)     On Fosamax 70 Mg qWeek, And OTC D3 2K IU Daily, And OTC CaCO3 500 Mg BID    i SANAM With Hypersomnia     Dr. Chari Caal; Patient Prefers A Dental Appliance    j H/O Adenomatous Colon Polyp On Previous TC ###    Dr. Donn Lopez (After Her 8/19/19 TC Was Free Of Polyps): "Repeat TC In 5 Yrs"    k Nocturia     09/2014 Referred To Dr. Kyler Eller     l Bilateral Plantar Fasciitis     Dr. Farrar "Dax" Jayne Last Saw Her On 1/4/17    l Hallux Limitus Right Foot > Left Foot     Dr. Charan Godoy ([facility]) Last Saw Her On 1/4/17    l Left Shoulder Arthropathy     l Left Thumb Base Osteoarthritis     8/18/22 Referred To Dr. Jhonathan Nance; 8/18/22 RXd Voltaren Topical    l Right Shoulder Pain With Inflammation On 11/14 MRI     Dr. Jess Fox; Received PT For This 11/2014    m Bilateral Leg Paresthesias ###    Dr. Jackelin Montes; 1/22/19 Brain MRI W/O Contrast = (See Report); 2/25/19 Brain And Neck MRA = Unremarkable (See Reports)    m Chronic Fatigue     10/17/16 CBC, TFTs (On Synthroid), Vitamin B12 Level, And Vitamin D3 Level = Normal;  imporving    m H/O CVA From PFO 04/2014 With No Sequelae ###    Dr. Jackelin Montes; Dr. Naranjo (Neurology); In 2016 Her Insurance Denied Coverage For Her Recommended PFO Closure Procedure    m Migraines     Dr. Jackelin Montes; Infrequent    n Chronic Depression     o Recurrent Dizziness Episodes ###    Dr. Jackelin Montes; 1/22/19 Brain MRI W/O Contrast = (See Report); 2/25/19 Brain And Neck MRA = Unremarkable (See Reports)    o Recurrent sinusitis     1/18/19 Referred To Dr. Perez Somers    p Early Bilateral Cataracts     Dr. 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 MORAIMA Marroquin    q BLE Varicose Veins     q Borderline Vitamin D Deficiency     12/13/19 RXd OTC D3 2K IU Daily    Wellness Visit 8/18/2022        Past Surgical " History:   Procedure Laterality Date    DILATION AND CURETTAGE OF UTERUS      HYSTERECTOMY      OOPHORECTOMY      TONSILLECTOMY         Family History   Problem Relation Age of Onset    Depression Other     Diabetes Other     Thyroid disease Other     Lung cancer Other     Diabetes Mother     Blindness Mother     Endocrine tumor Mother     Lung cancer Father     Cancer Father         lung and brain    Anxiety disorder Sister     Deafness Sister     Deafness Brother     Cancer Sister         bladder    Valvular heart disease Sister        Social History     Socioeconomic History    Marital status:      Spouse name: Chinmay    Number of children: 1   Occupational History    Occupation: Sales   Tobacco Use    Smoking status: Never    Smokeless tobacco: Never   Substance and Sexual Activity    Alcohol use: Yes     Alcohol/week: 1.0 standard drink     Types: 1 Glasses of wine per week     Comment: occasionally    Drug use: No    Sexual activity: Not Currently     Partners: Male       Allergies:  Codeine, Plavix [clopidogrel], and Ramipril    Medications:  Outpatient Encounter Medications as of 1/9/2023   Medication Sig Dispense Refill    alendronate (FOSAMAX) 70 MG tablet TAKE 1 TABLET BY MOUTH EVERY 7 DAYS 12 tablet 0    aspirin (ECOTRIN) 81 MG EC tablet Take 1 tablet (81 mg total) by mouth once daily.  0    atorvastatin (LIPITOR) 40 MG tablet Take 1 tablet (40 mg total) by mouth every evening. 90 tablet 3    blood sugar diagnostic (CONTOUR NEXT TEST STRIPS) Strp 1 each by Other route before breakfast. For diagnosis code E11.65 100 each 3    calcium-vitamin D3 (OS-JAYLIN 500 + D3) 500 mg(1,250mg) -200 unit per tablet Take 1 tablet by mouth 2 (two) times daily with meals. OSCAL 500/200 D-3 500-200 MG-UNIT TABS      cholecalciferol, vitamin D3, (VITAMIN D3) 2,000 unit Tab Take 1 tablet (2,000 Units total) by mouth once daily.      diabetic supplies, miscellan. Misc MICROLET LANCETS MISC      fluorouraciL (EFUDEX) 5 %  cream APPLY TOPICALLY TO AFFECTED AREA ON THE RIGHT ARM 1 TO 2 TIMES PER DAY AS TOLERATED      glimepiride (AMARYL) 2 MG tablet TAKE 1 & 1/2 (ONE & ONE-HALF) TABLETS BY MOUTH TWICE DAILY 270 tablet 3    GLUC HCL/GLUC HUGHES/OF-IQV-W-GLUC (GLUCOSAMINE COMPLEX ORAL) Take 1 tablet by mouth once daily at 6am.       ketorolac 0.5% (ACULAR) 0.5 % Drop Place 1 drop into both eyes 2 (two) times daily.      lancets (MICROLET LANCET) Misc USE 1  TO CHECK GLUCOSE TWICE DAILY 100 each 3    levothyroxine (SYNTHROID) 50 MCG tablet Take 1 tablet (50 mcg total) by mouth every morning. 90 tablet 3    losartan (COZAAR) 100 MG tablet Take 1 tablet (100 mg total) by mouth once daily. 90 tablet 3    metFORMIN (GLUCOPHAGE-XR) 750 MG ER 24hr tablet Take 1 tablet (750 mg total) by mouth 2 (two) times daily. 180 tablet 3    mupirocin (BACTROBAN) 2 % ointment Apply topically 2 (two) times daily as needed. 30 g 0    FOLIC ACID/MULTIVIT-MIN/LUTEIN (CENTRUM SILVER ORAL) Take 1 tablet by mouth nightly.       KELP ORAL Take 1 tablet by mouth once daily at 6am. KELP TABS      magnesium 30 mg Tab Take 1 capsule by mouth nightly. MAGNESIUM TABS             PHYSICAL EXAMINATION:    The patient generally appears in good health, is appropriately interactive, and is in no apparent distress.    Skin: No lesions.    Mental: Cooperative with normal affect.    Neuro: Grossly intact.    HEENT: Normal. No evidence of lymphadenopathy.    Chest:  normal inspiratory effort.    Abdomen: Soft, non-tender. No masses or organomegaly. Bladder is not palpable. No evidence of flank discomfort. No evidence of inguinal hernia.    Extremities: No clubbing, cyanosis, or edema    Normal external female genitalia  Urethral meatus is normal  Urethra and bladder are nontender to bimanual exam  Well supported posteriorly   Anteriorly, there is a stage II cystocele  Uterus and cervix are normal  No adnexal masses  PVR by catheterization was 30 ml    Aa +0.5 Ba +0.5, C -7  gH 4, pB  2, TVL  8.5  Ap -3, Bp -3, D n/a    LABS:    Lab Results   Component Value Date    BUN 16 09/12/2022    CREATININE 0.62 09/12/2022       UA 1.015, pH 5, tr protein, tr blood, otherwise, negative.  On Microscopy, there were no RBC's    IMPRESSION:    Cystocele, central    PLAN:    1.  Discussed the other staff who do prolapse cases in [place].  Referred to Dr. Alen Davis, who is very experienced.  2.  The [facility] information leaflet for anterior colporrhaphy was provided.     Copy to: Nadira Garcia NP, Dr. Alen Davis    I spent 45 minutes with the patient of which more than half was spent in direct consultation with the patient in regards to our treatment and plan.

## 2023-01-18 ENCOUNTER — OFFICE VISIT (OUTPATIENT)
Dept: OBSTETRICS AND GYNECOLOGY | Facility: CLINIC | Age: 73
End: 2023-01-18
Payer: MEDICARE

## 2023-01-18 VITALS
BODY MASS INDEX: 30.1 KG/M2 | SYSTOLIC BLOOD PRESSURE: 132 MMHG | WEIGHT: 163.56 LBS | RESPIRATION RATE: 14 BRPM | HEIGHT: 62 IN | DIASTOLIC BLOOD PRESSURE: 78 MMHG

## 2023-01-18 DIAGNOSIS — Z01.419 ENCOUNTER FOR ANNUAL ROUTINE GYNECOLOGICAL EXAMINATION: ICD-10-CM

## 2023-01-18 DIAGNOSIS — M85.80 OSTEOPENIA, UNSPECIFIED LOCATION: ICD-10-CM

## 2023-01-18 DIAGNOSIS — Z12.4 SCREENING FOR MALIGNANT NEOPLASM OF CERVIX: Primary | ICD-10-CM

## 2023-01-18 PROCEDURE — 99214 OFFICE O/P EST MOD 30 MIN: CPT | Mod: PBBFAC,PN,25 | Performed by: OBSTETRICS & GYNECOLOGY

## 2023-01-18 PROCEDURE — 99999 PR PBB SHADOW E&M-EST. PATIENT-LVL IV: ICD-10-PCS | Mod: PBBFAC,,, | Performed by: OBSTETRICS & GYNECOLOGY

## 2023-01-18 PROCEDURE — G0101 CA SCREEN;PELVIC/BREAST EXAM: HCPCS | Mod: PBBFAC,PN | Performed by: OBSTETRICS & GYNECOLOGY

## 2023-01-18 PROCEDURE — G0101 PR CA SCREEN;PELVIC/BREAST EXAM: ICD-10-PCS | Mod: S$PBB,,, | Performed by: OBSTETRICS & GYNECOLOGY

## 2023-01-18 PROCEDURE — G0101 CA SCREEN;PELVIC/BREAST EXAM: HCPCS | Mod: S$PBB,,, | Performed by: OBSTETRICS & GYNECOLOGY

## 2023-01-18 PROCEDURE — 99999 PR PBB SHADOW E&M-EST. PATIENT-LVL IV: CPT | Mod: PBBFAC,,, | Performed by: OBSTETRICS & GYNECOLOGY

## 2023-01-18 RX ORDER — ALENDRONATE SODIUM 70 MG/1
70 TABLET ORAL
Qty: 12 TABLET | Refills: 4 | Status: SHIPPED | OUTPATIENT
Start: 2023-01-18 | End: 2024-03-18

## 2023-01-18 NOTE — PROGRESS NOTES
"Chief Complaint   Patient presents with    Well Woman    Medication Refill       History and Physical:  No LMP recorded. Patient has had a hysterectomy.       Anne Roger is a 72 y.o. WF who presents today for her routine annual GYN exam. The patient has no Gynecology complaints today. Seeing Urol for UTI      Allergies:   Review of patient's allergies indicates:   Allergen Reactions    Codeine     Plavix [clopidogrel]      Resistant to Plavix    Ramipril      Cough       Past Medical History:   Diagnosis Date    a Coronary Artery Disease     Dr. Pepe Lisa; 5/31/17 LHC/Angiogram = (See Report); On Medical Management Only    a Patent Foramen Ovale With Atrial Septal Aneurym ####    Dr. Pepe Lisa; Dr. Jackelin Montes; In 2016 Her Insurance Denied Coverage For Her Recommended PFO Closure Procedure    b Hypertension     1/18/19 Increased Losartan 12.5 Mg To 25 Mg Daily    c Hypercholesterolemia     d Type 2 Diabetes Mellitus     12/13/19 Referred To Dr. Radha Dietz; 6/10/19 Increased Metformin-XR To 750 Mg BID; On Metformin- Mg BID And Amaryl 2 Mg BID    e Hypothyroidism     f Osteopenia (Improved From Previous Osteoporosis)     On Fosamax 70 Mg qWeek, And OTC D3 2K IU Daily, And OTC CaCO3 500 Mg BID    i SANAM With Hypersomnia     Dr. Chari Caal; Patient Prefers A Dental Appliance    j H/O Adenomatous Colon Polyp On Previous TC ###    Dr. Donn Lopez (After Her 8/19/19 TC Was Free Of Polyps): "Repeat TC In 5 Yrs"    k Nocturia     09/2014 Referred To Dr. Kyler Eller     l Bilateral Plantar Fasciitis     Dr. Charan Godoy ([facility]) Last Saw Her On 1/4/17    l Hallux Limitus Right Foot > Left Foot     Dr. Charan Godoy ([facility]) Last Saw Her On 1/4/17    l Left Shoulder Arthropathy     l Left Thumb Base Osteoarthritis     8/18/22 Referred To Dr. Jhonathan Nance; 8/18/22 RXd Voltaren Topical    l Right Shoulder Pain With Inflammation On 11/14 MRI     Dr. Jess Fox; Received PT For This 11/2014    m Bilateral " Leg Paresthesias ###    Dr. Jackelin Montes; 1/22/19 Brain MRI W/O Contrast = (See Report); 2/25/19 Brain And Neck MRA = Unremarkable (See Reports)    m Chronic Fatigue     10/17/16 CBC, TFTs (On Synthroid), Vitamin B12 Level, And Vitamin D3 Level = Normal;  imporving    m H/O CVA From PFO 04/2014 With No Sequelae ###    Dr. Jackelin Montes; Dr. Naranjo (Neurology); In 2016 Her Insurance Denied Coverage For Her Recommended PFO Closure Procedure    m Migraines     Dr. Jackelin Montes; Infrequent    n Chronic Depression     o Recurrent Dizziness Episodes ###    Dr. Jackelin Montes; 1/22/19 Brain MRI W/O Contrast = (See Report); 2/25/19 Brain And Neck MRA = Unremarkable (See Reports)    o Recurrent sinusitis     1/18/19 Referred To Dr. Perez Somers    p Early Bilateral Cataracts     Dr. 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 MORAIMA Marroquin    q BLE Varicose Veins     q Borderline Vitamin D Deficiency     12/13/19 RXd OTC D3 2K IU Daily    Wellness Visit 8/18/2022        Past Surgical History:   Procedure Laterality Date    DILATION AND CURETTAGE OF UTERUS      HYSTERECTOMY      OOPHORECTOMY      TONSILLECTOMY         MEDS:   Current Outpatient Medications on File Prior to Visit   Medication Sig Dispense Refill    aspirin (ECOTRIN) 81 MG EC tablet Take 1 tablet (81 mg total) by mouth once daily.  0    atorvastatin (LIPITOR) 40 MG tablet Take 1 tablet (40 mg total) by mouth every evening. 90 tablet 3    blood sugar diagnostic (CONTOUR NEXT TEST STRIPS) Strp 1 each by Other route before breakfast. For diagnosis code E11.65 100 each 3    calcium-vitamin D3 (OS-JAYLIN 500 + D3) 500 mg(1,250mg) -200 unit per tablet Take 1 tablet by mouth 2 (two) times daily with meals. OSCAL 500/200 D-3 500-200 MG-UNIT TABS      cholecalciferol, vitamin D3, (VITAMIN D3) 2,000 unit Tab Take 1 tablet (2,000 Units total) by mouth once daily.      diabetic supplies, miscellan. Misc MICROLET LANCETS MISC      fluorouraciL (EFUDEX) 5 % cream APPLY  TOPICALLY TO AFFECTED AREA ON THE RIGHT ARM 1 TO 2 TIMES PER DAY AS TOLERATED      FOLIC ACID/MULTIVIT-MIN/LUTEIN (CENTRUM SILVER ORAL) Take 1 tablet by mouth nightly.       glimepiride (AMARYL) 2 MG tablet TAKE 1 & 1/2 (ONE & ONE-HALF) TABLETS BY MOUTH TWICE DAILY 270 tablet 3    GLUC HCL/GLUC HUGHES/TT-WNI-D-GLUC (GLUCOSAMINE COMPLEX ORAL) Take 1 tablet by mouth once daily at 6am.       ketorolac 0.5% (ACULAR) 0.5 % Drop Place 1 drop into both eyes 2 (two) times daily.      lancets (MICROLET LANCET) Misc USE 1  TO CHECK GLUCOSE TWICE DAILY 100 each 3    levothyroxine (SYNTHROID) 50 MCG tablet Take 1 tablet (50 mcg total) by mouth every morning. 90 tablet 3    losartan (COZAAR) 100 MG tablet Take 1 tablet (100 mg total) by mouth once daily. 90 tablet 3    magnesium 30 mg Tab Take 1 capsule by mouth nightly. MAGNESIUM TABS      metFORMIN (GLUCOPHAGE-XR) 750 MG ER 24hr tablet Take 1 tablet (750 mg total) by mouth 2 (two) times daily. 180 tablet 3    mupirocin (BACTROBAN) 2 % ointment Apply topically 2 (two) times daily as needed. 30 g 0    [DISCONTINUED] alendronate (FOSAMAX) 70 MG tablet TAKE 1 TABLET BY MOUTH EVERY 7 DAYS 12 tablet 0    KELP ORAL Take 1 tablet by mouth once daily at 6am. KELP TABS       No current facility-administered medications on file prior to visit.       OB History          4    Para   1    Term                AB   3    Living   1         SAB        IAB        Ectopic        Multiple        Live Births                     Social History     Socioeconomic History    Marital status:      Spouse name: Chinmay    Number of children: 1   Occupational History    Occupation: Sales   Tobacco Use    Smoking status: Never    Smokeless tobacco: Never   Substance and Sexual Activity    Alcohol use: Yes     Alcohol/week: 1.0 standard drink     Types: 1 Glasses of wine per week     Comment: occasionally    Drug use: No    Sexual activity: Not Currently     Partners: Male       Family  "History   Problem Relation Age of Onset    Depression Other     Diabetes Other     Thyroid disease Other     Lung cancer Other     Diabetes Mother     Blindness Mother     Endocrine tumor Mother     Lung cancer Father     Cancer Father         lung and brain    Anxiety disorder Sister     Deafness Sister     Deafness Brother     Cancer Sister         bladder    Valvular heart disease Sister          Past medical and surgical history reviewed.   I have reviewed the patient's medical history in detail and updated the computerized patient record.        Review of System:   General: no chills, fever, night sweats, weight gain or weight loss  Psychological: no depression or suicidal ideation  Breasts: no new or changing breast lumps, nipple discharge or masses.  Respiratory: no cough, shortness of breath, or wheezing  Cardiovascular: no chest pain or dyspnea on exertion  Gastrointestinal: no abdominal pain, change in bowel habits, or black or bloody stools  Genito-Urinary: no incontinence, urinary frequency/urgency or vulvar/vaginal symptoms, pelvic pain or abnormal vaginal bleeding.  Musculoskeletal: no gait disturbance or muscular weakness      Physical Exam:   /78   Resp 14   Ht 5' 2" (1.575 m)   Wt 74.2 kg (163 lb 9.3 oz)   BMI 29.92 kg/m²   Constitutional: She is oriented to person, place, and time. She appears well-developed and well-nourished. No distress.  HENT:   Head: Normocephalic and atraumatic.   Eyes: Conjunctivae and EOM are normal. No scleral icterus.   Neck: Normal range of motion. Neck supple. No tracheal deviation present.   Cardiovascular: Normal rate.    Pulmonary/Chest: Effort normal. No respiratory distress. She exhibits no tenderness.  Breasts: are symmetrical.no masses    Right breast exhibits no inverted nipple, no mass, no nipple discharge, no skin change and no tenderness.   Left breast exhibits no inverted nipple, no mass, no nipple discharge, no skin change and no " tenderness.  Abdominal: Soft. She exhibits no distension and no mass. There is no tenderness. There is no rebound and no guarding.   Genitourinary:    External rectal exam shows no thrombosed external hemorrhoids.    Pelvic exam was performed with patient supine.   No labial fusion.   There is no rash, lesion or injury on the right labia.   There is no rash, lesion or injury on the left labia.   No bleeding and no signs of injury around the vaginal introitus, urethra is without lesions and well supported.    No vaginal discharge found.    + Cystocele, Enterocele or rectocele, and cuff well supported.   Bimanual exam:   The urethra and vagina are without palpable masses or tenderness.   Uterus and cervix are surgically absents, vaginal cuff is intact and well supported.   Right adnexum displays no mass and no tenderness.   Left adnexum displays no mass and no tenderness.  Musculoskeletal: Normal range of motion.   Lymphadenopathy: No inguinal adenopathy present.   Neurological: She is alert and oriented to person, place, and time. Coordination normal.   Skin: Skin is warm and dry. She is not diaphoretic.   Psychiatric: She has a normal mood and affect.        Assessment:   Normal annual GYN exam  1. Screening for malignant neoplasm of cervix        2. Encounter for annual routine gynecological examination        3. Osteopenia, unspecified location        cystocele      Plan:   Pessary fitting if desired  PAP  Not Needed  Mammogram  May see Dr Davis for prolapse  Follow up in 1 year.

## 2023-06-26 ENCOUNTER — OFFICE VISIT (OUTPATIENT)
Dept: UROLOGY | Facility: CLINIC | Age: 73
End: 2023-06-26
Payer: MEDICARE

## 2023-06-26 VITALS
BODY MASS INDEX: 29.66 KG/M2 | HEART RATE: 85 BPM | HEIGHT: 62 IN | WEIGHT: 161.19 LBS | SYSTOLIC BLOOD PRESSURE: 117 MMHG | DIASTOLIC BLOOD PRESSURE: 64 MMHG

## 2023-06-26 DIAGNOSIS — N39.0 RECURRENT UTI: ICD-10-CM

## 2023-06-26 DIAGNOSIS — R35.1 NOCTURIA: Primary | ICD-10-CM

## 2023-06-26 DIAGNOSIS — R35.0 INCREASED FREQUENCY OF URINATION: ICD-10-CM

## 2023-06-26 PROCEDURE — 99214 OFFICE O/P EST MOD 30 MIN: CPT | Mod: PBBFAC | Performed by: UROLOGY

## 2023-06-26 PROCEDURE — 51701 INSERT BLADDER CATHETER: CPT | Mod: S$PBB,,, | Performed by: UROLOGY

## 2023-06-26 PROCEDURE — 81002 URINALYSIS NONAUTO W/O SCOPE: CPT | Mod: PBBFAC | Performed by: UROLOGY

## 2023-06-26 PROCEDURE — 51701 INSERT BLADDER CATHETER: CPT | Mod: PBBFAC | Performed by: UROLOGY

## 2023-06-26 PROCEDURE — 51701 PR INSERTION OF NON-INDWELLING BLADDER CATHETERIZATION FOR RESIDUAL UR: ICD-10-PCS | Mod: S$PBB,,, | Performed by: UROLOGY

## 2023-06-26 PROCEDURE — 99215 OFFICE O/P EST HI 40 MIN: CPT | Mod: S$PBB,25,, | Performed by: UROLOGY

## 2023-06-26 PROCEDURE — 87086 URINE CULTURE/COLONY COUNT: CPT | Performed by: UROLOGY

## 2023-06-26 PROCEDURE — 99999 PR PBB SHADOW E&M-EST. PATIENT-LVL IV: CPT | Mod: PBBFAC,,, | Performed by: UROLOGY

## 2023-06-26 PROCEDURE — 99215 PR OFFICE/OUTPT VISIT, EST, LEVL V, 40-54 MIN: ICD-10-PCS | Mod: S$PBB,25,, | Performed by: UROLOGY

## 2023-06-26 PROCEDURE — 99999 PR PBB SHADOW E&M-EST. PATIENT-LVL IV: ICD-10-PCS | Mod: PBBFAC,,, | Performed by: UROLOGY

## 2023-06-26 RX ORDER — DOXYCYCLINE HYCLATE 100 MG
100 TABLET ORAL ONCE
Status: CANCELLED | OUTPATIENT
Start: 2023-06-26 | End: 2023-06-26

## 2023-06-26 RX ORDER — LIDOCAINE HYDROCHLORIDE 20 MG/ML
JELLY TOPICAL ONCE
Status: CANCELLED | OUTPATIENT
Start: 2023-06-26 | End: 2023-06-26

## 2023-06-26 NOTE — PROGRESS NOTES
CHIEF COMPLAINT:    Mrs. Roger is a 72 y.o. female presenting for a follow up on cystocele and recurrent UTI    PRESENTING ILLNESS:    Anne Roger is a 72 y.o. female who is presents with a history of recurrent UTI and a cystocele.  At her initial consultation with me she expressed not wanting to come to [facility] and I had given her Dr. Davis's name since she lives in [place]. She states she has gotten two more symptomatic bladder infections.  She manifests with dysuria, frequency with small volumes.     She also states she has nocturia every 3 to 3 1/2 hours.  She sometimes has frequency hourly.        REVIEW OF SYSTEMS:    Review of Systems   Constitutional: Negative.    HENT: Negative.     Eyes: Negative.    Respiratory: Negative.     Cardiovascular: Negative.    Gastrointestinal: Negative.    Genitourinary:  Positive for frequency and urgency.        Nocturia   Musculoskeletal: Negative.    Skin: Negative.    Neurological: Negative.    Endo/Heme/Allergies: Negative.    Psychiatric/Behavioral: Negative.       PATIENT HISTORY:    Past Medical History:   Diagnosis Date    a Coronary Artery Disease     Dr. Pepe Lisa; 5/31/17 C/Angiogram = (See Report); On Medical Management Only    a Patent Foramen Ovale With Atrial Septal Aneurym ####    Dr. Pepe Lisa; Dr. Jackelin Montes; In 2016 Her Insurance Denied Coverage For Her Recommended PFO Closure Procedure    b Hypertension     1/18/19 Increased Losartan 12.5 Mg To 25 Mg Daily    c Hypercholesterolemia     d Type 2 Diabetes Mellitus     12/13/19 Referred To Dr. Radha Dietz; 6/10/19 Increased Metformin-XR To 750 Mg BID; On Metformin- Mg BID And Amaryl 2 Mg BID    e Hypothyroidism     f Osteopenia (Improved From Previous Osteoporosis)     On Fosamax 70 Mg qWeek, And OTC D3 2K IU Daily, And OTC CaCO3 500 Mg BID    i SANAM With Hypersomnia     Dr. Chari Caal; Patient Prefers A Dental Appliance    j H/O Adenomatous Colon Polyp On Previous TC ###  "   Dr. Donn Lopez (After Her 8/19/19 TC Was Free Of Polyps): "Repeat TC In 5 Yrs"    k Nocturia     09/2014 Referred To Dr. Kyler Eller     l Bilateral Plantar Fasciitis     Dr. Charan Godoy ([facility]) Last Saw Her On 1/4/17    l Hallux Limitus Right Foot > Left Foot     Dr. Charan Godoy ([facility]) Last Saw Her On 1/4/17    l Left Shoulder Arthropathy     l Left Thumb Base Osteoarthritis     8/18/22 Referred To Dr. Jhonathan Nance; 8/18/22 RXd Voltaren Topical    l Right Shoulder Pain With Inflammation On 11/14 MRI     Dr. Jess Fox; Received PT For This 11/2014    m Bilateral Leg Paresthesias     Dr. Jackelin Montes; 1/22/19 Brain MRI W/O Contrast = (See Report); 2/25/19 Brain And Neck MRA = Unremarkable (See Reports)    m Chronic Fatigue     10/17/16 CBC, TFTs (On Synthroid), Vitamin B12 Level, And Vitamin D3 Level = Normal;  imporving    m H/O CVA From PFO 04/2014 With No Sequelae ###    Dr. Jackelin Montes; Dr. Naranjo (Neurology); In 2016 Her Insurance Denied Coverage For Her Recommended PFO Closure Procedure    m Migraines     Dr. Jackelin Montes; Infrequent    n Chronic Depression     o Recurrent Dizziness Episodes ###    Dr. Jackelin Montes; 1/22/19 Brain MRI W/O Contrast = (See Report); 2/25/19 Brain And Neck MRA = Unremarkable (See Reports)    o Recurrent sinusitis     1/18/19 Referred To Dr. Perez Somers    p Early Bilateral Cataracts     Dr. 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 MORAIMA Marroquin    q BLE Varicose Veins     q Borderline Vitamin D Deficiency     12/13/19 RXd OTC D3 2K IU Daily    Wellness Visit 8/18/2022        Past Surgical History:   Procedure Laterality Date    DILATION AND CURETTAGE OF UTERUS      HYSTERECTOMY      OOPHORECTOMY      TONSILLECTOMY         Family History   Problem Relation Age of Onset    Depression Other     Diabetes Other     Thyroid disease Other     Lung cancer Other     Diabetes Mother     Blindness Mother     Endocrine tumor Mother     Lung cancer Father     Cancer Father     "     lung and brain    Anxiety disorder Sister     Deafness Sister     Deafness Brother     Cancer Sister         bladder    Valvular heart disease Sister        Social History     Socioeconomic History    Marital status:      Spouse name: Chinmay    Number of children: 1   Occupational History    Occupation: Sales   Tobacco Use    Smoking status: Never    Smokeless tobacco: Never   Substance and Sexual Activity    Alcohol use: Yes     Alcohol/week: 1.0 standard drink     Types: 1 Glasses of wine per week     Comment: occasionally    Drug use: No    Sexual activity: Not Currently     Partners: Male       Allergies:  Codeine, Plavix [clopidogrel], and Ramipril    Medications:  Outpatient Encounter Medications as of 6/26/2023   Medication Sig Dispense Refill    (Magic mouthwash) 1:1:1 diphenhydrAMINE(Benadryl) 12.5mg/5ml liq, aluminum & magnesium hydroxide-simethicone (Maalox), LIDOcaine viscous 2% Swish and spit 5 mLs every 8 (eight) hours as needed (sore throat). for mouth sores 90 mL 0    alendronate (FOSAMAX) 70 MG tablet Take 1 tablet (70 mg total) by mouth every 7 days. 12 tablet 4    aspirin (ECOTRIN) 81 MG EC tablet Take 1 tablet (81 mg total) by mouth once daily.  0    atorvastatin (LIPITOR) 40 MG tablet Take 1 tablet (40 mg total) by mouth every evening. 90 tablet 3    benzonatate (TESSALON) 200 MG capsule Take 1 capsule (200 mg total) by mouth 3 (three) times daily as needed for Cough. 30 capsule 0    blood sugar diagnostic (CONTOUR NEXT TEST STRIPS) Strp 1 each by Other route before breakfast. For diagnosis code E11.65 100 each 3    calcium-vitamin D3 (OS-JAYLIN 500 + D3) 500 mg(1,250mg) -200 unit per tablet Take 1 tablet by mouth 2 (two) times daily with meals. OSCAL 500/200 D-3 500-200 MG-UNIT TABS      cholecalciferol, vitamin D3, (VITAMIN D3) 2,000 unit Tab Take 1 tablet (2,000 Units total) by mouth once daily.      diabetic supplies, miscellan. Misc MICROLET LANCETS MISC      fluorouraciL (EFUDEX)  5 % cream APPLY TOPICALLY TO AFFECTED AREA ON THE RIGHT ARM 1 TO 2 TIMES PER DAY AS TOLERATED      FOLIC ACID/MULTIVIT-MIN/LUTEIN (CENTRUM SILVER ORAL) Take 1 tablet by mouth nightly.       glimepiride (AMARYL) 2 MG tablet TAKE 1 & 1/2 (ONE & ONE-HALF) TABLETS BY MOUTH TWICE DAILY 270 tablet 3    GLUC HCL/GLUC HUGHES/FP-JAX-F-GLUC (GLUCOSAMINE COMPLEX ORAL) Take 1 tablet by mouth once daily at 6am.       KELP ORAL Take 1 tablet by mouth once daily at 6am. KELP TABS      ketorolac 0.5% (ACULAR) 0.5 % Drop Place 1 drop into both eyes 2 (two) times daily.      lancets (MICROLET LANCET) Misc USE 1  TO CHECK GLUCOSE TWICE DAILY 100 each 3    levothyroxine (SYNTHROID) 75 MCG tablet Take 75 mcg by mouth.      losartan (COZAAR) 100 MG tablet Take 1 tablet (100 mg total) by mouth once daily. 90 tablet 3    magnesium 30 mg Tab Take 1 capsule by mouth nightly. MAGNESIUM TABS      metFORMIN (GLUCOPHAGE-XR) 750 MG ER 24hr tablet Take 1 tablet (750 mg total) by mouth 2 (two) times daily. 180 tablet 3    mupirocin (BACTROBAN) 2 % ointment Apply topically 2 (two) times daily as needed. 30 g 0     No facility-administered encounter medications on file as of 6/26/2023.         PHYSICAL EXAMINATION:    The patient generally appears in good health, is appropriately interactive, and is in no apparent distress.    Skin: No lesions.    Mental: Cooperative with normal affect.    Neuro: Grossly intact.    HEENT: Normal. No evidence of lymphadenopathy.    Chest:  normal inspiratory effort.    Abdomen: Soft, non-tender. No masses or organomegaly. Bladder is not palpable. No evidence of flank discomfort. No evidence of inguinal hernia.    Extremities: No clubbing, cyanosis, or edema    Normal external female genitalia  Urethral meatus is normal  Urethra and bladder are nontender to bimanual exam  Well supported posteriorly   Stage II cystocele  Uterus and cervix are surgically absent  No adnexal masses  PVR by catheterization was 120  ml    LABS:    Lab Results   Component Value Date    BUN 20 (H) 03/17/2023    CREATININE 0.76 03/17/2023       UA 1.015, pH 5, otherwise, negative.     IMPRESSION:    Recurrent UTI  Cystocele  Incomplete bladder emptying    PLAN:    1.  The catheterized specimen was sent for culture  2.  SUDS/cysto  3.  Renal ultrasound  4.  She prefers to be seen at the [facility].    I spent 40 minutes with the patient of which more than half was spent in direct consultation with the patient in regards to our treatment and plan.

## 2023-06-27 LAB — BACTERIA UR CULT: NORMAL

## 2023-06-28 ENCOUNTER — HOSPITAL ENCOUNTER (OUTPATIENT)
Dept: RADIOLOGY | Facility: HOSPITAL | Age: 73
Discharge: HOME | End: 2023-06-28
Attending: UROLOGY
Payer: MEDICARE

## 2023-06-28 DIAGNOSIS — N39.0 RECURRENT UTI: ICD-10-CM

## 2023-06-28 PROCEDURE — 76770 US EXAM ABDO BACK WALL COMP: CPT | Mod: TC,PO

## 2023-06-28 PROCEDURE — 76770 US EXAM ABDO BACK WALL COMP: CPT | Mod: 26,,, | Performed by: RADIOLOGY

## 2023-06-28 PROCEDURE — 76770 US KIDNEY: ICD-10-PCS | Mod: 26,,, | Performed by: RADIOLOGY

## 2023-07-03 ENCOUNTER — PATIENT MESSAGE (OUTPATIENT)
Dept: UROLOGY | Facility: CLINIC | Age: 73
End: 2023-07-03
Payer: MEDICARE

## 2023-08-02 ENCOUNTER — TELEPHONE (OUTPATIENT)
Dept: UROLOGY | Facility: HOSPITAL | Age: 73
End: 2023-08-02
Payer: MEDICARE

## 2023-08-02 DIAGNOSIS — N39.0 RECURRENT UTI: Primary | ICD-10-CM

## 2023-08-02 RX ORDER — CEFDINIR 300 MG/1
300 CAPSULE ORAL 2 TIMES DAILY
Qty: 14 CAPSULE | Refills: 0 | Status: SHIPPED | OUTPATIENT
Start: 2023-08-02 | End: 2023-08-09

## 2023-08-11 ENCOUNTER — PROCEDURE VISIT (OUTPATIENT)
Dept: UROLOGY | Facility: CLINIC | Age: 73
End: 2023-08-11
Payer: MEDICARE

## 2023-08-11 VITALS
BODY MASS INDEX: 29.63 KG/M2 | WEIGHT: 161 LBS | HEART RATE: 77 BPM | DIASTOLIC BLOOD PRESSURE: 71 MMHG | TEMPERATURE: 98 F | SYSTOLIC BLOOD PRESSURE: 153 MMHG | HEIGHT: 62 IN

## 2023-08-11 DIAGNOSIS — R35.0 INCREASED FREQUENCY OF URINATION: ICD-10-CM

## 2023-08-11 DIAGNOSIS — N81.9 VAGINAL VAULT PROLAPSE: ICD-10-CM

## 2023-08-11 DIAGNOSIS — R35.1 NOCTURIA: ICD-10-CM

## 2023-08-11 DIAGNOSIS — N39.0 RECURRENT UTI: ICD-10-CM

## 2023-08-11 DIAGNOSIS — N81.12 LATERAL CYSTOCELE: Primary | ICD-10-CM

## 2023-08-11 RX ORDER — DOXYCYCLINE HYCLATE 100 MG
100 TABLET ORAL ONCE
Status: DISCONTINUED | OUTPATIENT
Start: 2023-08-11 | End: 2023-12-09

## 2023-08-11 RX ORDER — LIDOCAINE HYDROCHLORIDE 20 MG/ML
JELLY TOPICAL ONCE
Status: DISCONTINUED | OUTPATIENT
Start: 2023-08-11 | End: 2023-12-09

## 2023-08-11 NOTE — PATIENT INSTRUCTIONS
_                                                                                                                                                                                             If any problems after hours or weekends, you may call [phone] and ask for the urology resident on call. SIMPLE URODYNAMIC STUDY (SUDS) & CYSTOSCOPY  UROLOGY CLINIC DISCHARGE INSTRUCTIONS    You have had a procedure that will require time to properly heal. Follow the instructions you have been given on how to care for yourself once you are home. Below is additional information to help in your recovery.    ACTIVITY  There are no restrictions in activity. Start doing again the things you did before the procedure.  You may experience a slight burning sensation. You may notice a small amount of blood in your urine. This will clear up within a day. Call the clinic if this continues beyond 48 hours.    DIET  Continue your normal diet. You may eat the same foods you ate before your procedure.  Drink plenty of fluids during the first 24-48 hours following your procedure.    MEDICATIONS  Resume all other previous medications from your prescribing physician.  Continue any pre=procedure antibiotics until they are all gone.    SIGNS AND SYMPTOMS TO REPORT TO THE DOCTOR  Chills or fever greater than 101° F within 24 hours of procedure.  Changes in urination, such as increased bleeding, foul smell, cloudy urine, or painful urination.  Call your doctor with any questions or concerns.    For any emergency situation, call 911 immediately or go to your nearest emergency room.    [facility]  [phone]

## 2023-08-12 NOTE — PROCEDURES
Procedures    Urodynamic Report    Indication:  cystocele, vaginal vault prolapse    Patient was taken to the Urodynamic Suite with a comfortably full bladder and asked to perform a free uroflow.  Next, the patient was prepped and the urinary residual was drained with a 14 Fr catheter.  A 7 Fr dual lumen catheter was placed to measure intravesical pressures.  A 10 Fr balloon manometer was placed into the rectum for abdominal pressure measurements.  Patch EMG electrodes were placed on the perineum.  The patient was connected to the [employer] Urodynamic machine, using a multichannel technique, the data were interpreted.  The bladder was filled with sterile water at room temperature at a rate of 30 ml/min.  Patient is filled to urgency.  Filling is performed with the patient in the seated position.  Abdominal leak point pressures are checked at 1st desire, then serially at 50cc increments first with Valsalva then with coughing.  The patient was then asked to sit and void for a pressure flow study.    The following are the results of the study:  1.  Uroflow       Q max:  did not register       Voided volume:  30 ml       Pattern of the curve:    2.  PVR:  30 ml    3.  CMG       Sensation:         First Desire:  158 ml         Normal Desire:  193 ml         Strong Desire:  247 ml         Urgency:  278 ml       Capacity:  280 +       Abnormal Contractions:  none       Compliance: normal 12 ml/cm H2O    4.  Abdominal Leak Point Pressure:  no occult stress incontinence, reducing the prolapse with a half speculum    5.  EMG:  normal guarding, increase during voiding phase    6.  Voiding phase       Q max:  15.7 ml/sec       P det at Q max:  13.8 cm H2O       Pattern of the curve:  intermittent       Voided volume:  135 ml       PVR:  50 + ml    7.  Analysis:  increased sensation, no occult stress incontinence.  Decreased capacity    8.  Recommendations:       a.  Discussed pessary.  If the frequency and nocturia improve then  an anterior colporrhaphy will likely help with her urinary symptoms.   She will see if Dr. Welch can fit her with a pessary       b.  Consented for anterior colporrhaphy and SSL fixation.  Discussed that the bulging sensation will be improved with the surgery, unclear if the frequency and nocturia will be.  This will be better elucidated with pessary.   Information sheets from [facility] for anterior colporrhaphy, SSL fixation and pessary provided.  My FAQ about post op prolapse and incontinence information provided.        c.  Plan to perform on 9/26    CYSTOSCOPY REPORT    Pre Procedure Diagnosis:  recurrent UTI, frequency, nocturia    Post Procedure Diagnosis:  cystocele mild just behind bladder neck, deeper behind the trigone.     Anesthesia: 10 cc 2% lidocaine jelly applied per urethra.    14 FR Flexible Olympus cystoscope used.    FINDINGS:  Dome, anterior, posterior, lateral walls and bladder base free of urothelial abnormalities. Right and left ureteral orifices in the normal postion and configuration, both effluxed clear urine.  Bladder neck and urethra were normal.  The cystocele was mild behind the bladder neck and deeper behind the trigone.     Specimen:  none    The patient was taken to the cystoscopy suite and placed in dorsal lithotomy position.  The genitalia was prepped and draped  in the usual sterile fashion.  Two percent lidocaine jelly was inserted in the urethra.  After sufficent time had passed to allow good local anesthesia, the cystoscope was inserted in the urethra and passed into the bladder visualizing the urethra along its entire course.  The dome, anterior, posterior and lateral walls were examined systematically.  The ureteral orifices were in their usual position and configuration.  The cystoscope was turned upon itself 180 degrees to visualize the bladder neck.  The cystoscope was then brought to the level of the bladder neck, the water was turned on and the urethra was visualized.  The  cystoscope was removed and the patient was instructed to urinate prior to leaving the office.     Post procedure medication:  doxycycline 100 mg x 1    ADDITIONAL NOTES:  patient kept track of her frequency and nocturia.  Frequency ranged from 8-10, and nocturia x 4-5     ASSESSMENT/PLAN:  72 year old woman status post flexible cystoscopy.  1. Push fluids for 24 hours.  2. May see blood in the urine, this should gradually improve over the next 2-3 days.  3. The patient was instructed to return to the office or go to the emergency should fever, chills, cloudy urine, or inability to urinate develop.  4. Follow up in as above.

## 2023-08-14 ENCOUNTER — TELEPHONE (OUTPATIENT)
Dept: OBSTETRICS AND GYNECOLOGY | Facility: CLINIC | Age: 73
End: 2023-08-14
Payer: MEDICARE

## 2023-08-14 ENCOUNTER — PATIENT MESSAGE (OUTPATIENT)
Dept: UROLOGY | Facility: CLINIC | Age: 73
End: 2023-08-14
Payer: MEDICARE

## 2023-08-14 ENCOUNTER — OFFICE VISIT (OUTPATIENT)
Dept: OBSTETRICS AND GYNECOLOGY | Facility: CLINIC | Age: 73
End: 2023-08-14
Payer: MEDICARE

## 2023-08-14 VITALS
SYSTOLIC BLOOD PRESSURE: 122 MMHG | BODY MASS INDEX: 29.87 KG/M2 | DIASTOLIC BLOOD PRESSURE: 58 MMHG | WEIGHT: 163.38 LBS

## 2023-08-14 DIAGNOSIS — Z46.89 ENCOUNTER FOR FITTING AND ADJUSTMENT OF PESSARY: ICD-10-CM

## 2023-08-14 DIAGNOSIS — N81.10 BLADDER PROLAPSE, FEMALE, ACQUIRED: Primary | ICD-10-CM

## 2023-08-14 PROCEDURE — 57160 INSERT PESSARY/OTHER DEVICE: CPT | Mod: PBBFAC,PN

## 2023-08-14 PROCEDURE — 57160 INSERT PESSARY/OTHER DEVICE: CPT | Mod: S$PBB,,, | Performed by: OBSTETRICS & GYNECOLOGY

## 2023-08-14 PROCEDURE — 99999PBSHW PR PBB SHADOW TECHNICAL ONLY FILED TO HB: Mod: PBBFAC,,,

## 2023-08-14 PROCEDURE — 99214 OFFICE O/P EST MOD 30 MIN: CPT | Mod: PBBFAC,PN,25 | Performed by: OBSTETRICS & GYNECOLOGY

## 2023-08-14 PROCEDURE — 99999PBSHW PR PBB SHADOW TECHNICAL ONLY FILED TO HB: ICD-10-PCS | Mod: PBBFAC,,,

## 2023-08-14 PROCEDURE — 57160 PR FIT/INSERT INTRAVAG SUPPORT DEVICE: ICD-10-PCS | Mod: S$PBB,,, | Performed by: OBSTETRICS & GYNECOLOGY

## 2023-08-14 PROCEDURE — 99213 OFFICE O/P EST LOW 20 MIN: CPT | Mod: S$PBB,25,, | Performed by: OBSTETRICS & GYNECOLOGY

## 2023-08-14 PROCEDURE — A4562 PESSARY, NON RUBBER,ANY TYPE: HCPCS | Mod: PBBFAC,PN

## 2023-08-14 PROCEDURE — 99213 PR OFFICE/OUTPT VISIT, EST, LEVL III, 20-29 MIN: ICD-10-PCS | Mod: S$PBB,25,, | Performed by: OBSTETRICS & GYNECOLOGY

## 2023-08-14 PROCEDURE — 99999 PR PBB SHADOW E&M-EST. PATIENT-LVL IV: ICD-10-PCS | Mod: PBBFAC,,, | Performed by: OBSTETRICS & GYNECOLOGY

## 2023-08-14 PROCEDURE — 99999 PR PBB SHADOW E&M-EST. PATIENT-LVL IV: CPT | Mod: PBBFAC,,, | Performed by: OBSTETRICS & GYNECOLOGY

## 2023-08-14 NOTE — PROGRESS NOTES
Chief Complaint   Patient presents with    Vaginal Prolapse     C/o prolapse, desires pessary if able        History of Present Illness   72 y.o.    patient presents today for desires trial of pessary.  Planning a procedure with Dr Reagan in Sept. But wantest to try pessary first.    Past medical and surgical history reviewed.   I have reviewed the patient's medical history in detail and updated the computerized patient record.    Review of patient's allergies indicates:   Allergen Reactions    Codeine     Plavix [clopidogrel]      Resistant to Plavix    Ramipril      Cough    Ibandronate Nausea And Vomiting         Review of Systems -   GEN ROS: negative  Breast ROS: negative  Genito-Urinary ROS: prolapse      Physical Examination:  BP (!) 122/58   Wt 74.1 kg (163 lb 5.8 oz)   BMI 29.87 kg/m²    Pessary fitting  0 cube pessary fitting and placement      Assessment:  0 cube pessary fitting and placement  1. Bladder prolapse, female, acquired        2. Encounter for fitting and adjustment of pessary            Plan:  Instructions on pessary use and maintenance  F/u tomorrow if needed  F/u 2-3 weeks for f/u check    Patient informed will be contacted with results within 2 weeks. Encouraged to please call back or email if she has not heard from us by then.

## 2023-08-14 NOTE — TELEPHONE ENCOUNTER
----- Message from Devon Paniagua sent at 8/14/2023  1:36 PM CDT -----  Type: Needs Medical Advice  Who Called:  pt  Symptoms (please be specific):  pt said she need to know if the dr does pessary--please call and advise  Best Call Back Number: [phone] (home)     Additional Information: thank you

## 2023-08-15 ENCOUNTER — TELEPHONE (OUTPATIENT)
Dept: OBSTETRICS AND GYNECOLOGY | Facility: CLINIC | Age: 73
End: 2023-08-15
Payer: MEDICARE

## 2023-08-15 NOTE — TELEPHONE ENCOUNTER
----- Message from Rachel Sheikh sent at 8/15/2023  9:56 AM CDT -----  Regarding: appt  Contact: pt  Type:  Needs Medical Advice    Who Called: pt    Would the patient rather a call back or a response via [facility]? Call back    Best Call Back Number: [phone]    Additional Information: sts she was told to come in the office to day to have a pessary removed. She needs to come after 4--please advise and than you

## 2023-08-16 ENCOUNTER — OFFICE VISIT (OUTPATIENT)
Dept: OBSTETRICS AND GYNECOLOGY | Facility: CLINIC | Age: 73
End: 2023-08-16
Payer: MEDICARE

## 2023-08-16 VITALS
BODY MASS INDEX: 29.95 KG/M2 | DIASTOLIC BLOOD PRESSURE: 68 MMHG | WEIGHT: 163.81 LBS | SYSTOLIC BLOOD PRESSURE: 136 MMHG

## 2023-08-16 DIAGNOSIS — N81.10 BLADDER PROLAPSE, FEMALE, ACQUIRED: Primary | ICD-10-CM

## 2023-08-16 DIAGNOSIS — T83.9XXD PROBLEM WITH VAGINAL PESSARY, SUBSEQUENT ENCOUNTER: ICD-10-CM

## 2023-08-16 PROCEDURE — 99213 OFFICE O/P EST LOW 20 MIN: CPT | Mod: S$PBB,,, | Performed by: OBSTETRICS & GYNECOLOGY

## 2023-08-16 PROCEDURE — 99999 PR PBB SHADOW E&M-EST. PATIENT-LVL IV: ICD-10-PCS | Mod: PBBFAC,,, | Performed by: OBSTETRICS & GYNECOLOGY

## 2023-08-16 PROCEDURE — 99213 PR OFFICE/OUTPT VISIT, EST, LEVL III, 20-29 MIN: ICD-10-PCS | Mod: S$PBB,,, | Performed by: OBSTETRICS & GYNECOLOGY

## 2023-08-16 PROCEDURE — 99999 PR PBB SHADOW E&M-EST. PATIENT-LVL IV: CPT | Mod: PBBFAC,,, | Performed by: OBSTETRICS & GYNECOLOGY

## 2023-08-16 PROCEDURE — 99214 OFFICE O/P EST MOD 30 MIN: CPT | Mod: PBBFAC,PN | Performed by: OBSTETRICS & GYNECOLOGY

## 2023-08-16 NOTE — PROGRESS NOTES
Chief Complaint   Patient presents with    Pessary Check       History of Present Illness   72 y.o.    patient presents today for removal of pessary.  Pt was unable to remove pessary at home.  Pt was just fitted for pessary 2 days ago for first time.    Past medical and surgical history reviewed.   I have reviewed the patient's medical history in detail and updated the computerized patient record.    Review of patient's allergies indicates:   Allergen Reactions    Codeine     Plavix [clopidogrel]      Resistant to Plavix    Ramipril      Cough    Ibandronate Nausea And Vomiting         Review of Systems -   GEN ROS: negative  Breast ROS: negative for breast lumps  Genito-Urinary ROS: negative      Physical Examination:  /68   Wt 74.3 kg (163 lb 12.8 oz)   BMI 29.95 kg/m²    0 cube pessary was easily removed, cleaned and replaced without difficulty      Assessment:    1. Bladder prolapse, female, acquired        2. Problem with vaginal pessary, subsequent encounter            Plan:  Instructions given to pt for removal and reinsertion  F/u 4 weeks  Sooner if needed.

## 2023-08-22 ENCOUNTER — OFFICE VISIT (OUTPATIENT)
Dept: OBSTETRICS AND GYNECOLOGY | Facility: CLINIC | Age: 73
End: 2023-08-22
Payer: MEDICARE

## 2023-08-22 VITALS
SYSTOLIC BLOOD PRESSURE: 140 MMHG | WEIGHT: 162.25 LBS | BODY MASS INDEX: 29.68 KG/M2 | DIASTOLIC BLOOD PRESSURE: 68 MMHG

## 2023-08-22 DIAGNOSIS — T83.9XXD PROBLEM WITH VAGINAL PESSARY, SUBSEQUENT ENCOUNTER: ICD-10-CM

## 2023-08-22 DIAGNOSIS — N81.10 BLADDER PROLAPSE, FEMALE, ACQUIRED: ICD-10-CM

## 2023-08-22 DIAGNOSIS — N30.00 ACUTE CYSTITIS WITHOUT HEMATURIA: Primary | ICD-10-CM

## 2023-08-22 PROCEDURE — 99213 PR OFFICE/OUTPT VISIT, EST, LEVL III, 20-29 MIN: ICD-10-PCS | Mod: S$PBB,,, | Performed by: OBSTETRICS & GYNECOLOGY

## 2023-08-22 PROCEDURE — 99999 PR PBB SHADOW E&M-EST. PATIENT-LVL IV: ICD-10-PCS | Mod: PBBFAC,,, | Performed by: OBSTETRICS & GYNECOLOGY

## 2023-08-22 PROCEDURE — 99999 PR PBB SHADOW E&M-EST. PATIENT-LVL IV: CPT | Mod: PBBFAC,,, | Performed by: OBSTETRICS & GYNECOLOGY

## 2023-08-22 PROCEDURE — 99213 OFFICE O/P EST LOW 20 MIN: CPT | Mod: S$PBB,,, | Performed by: OBSTETRICS & GYNECOLOGY

## 2023-08-22 PROCEDURE — 99214 OFFICE O/P EST MOD 30 MIN: CPT | Mod: PBBFAC,PN | Performed by: OBSTETRICS & GYNECOLOGY

## 2023-08-22 NOTE — PROGRESS NOTES
Chief Complaint   Patient presents with    Urinary Tract Infection     Pt can feel pessary and feels it is unsanitary. Pt unable to remove Pessary. C/O having UTI since 2023, was seen at [facility].       History of Present Illness   72 y.o. WF f/u pessary check.  Pt was seen in Urgent Care, dx'd UTI, treated with Macrobid.  Culture result seen.  patient presents today for removal of pessary    Past medical and surgical history reviewed.   I have reviewed the patient's medical history in detail and updated the computerized patient record.    Review of patient's allergies indicates:   Allergen Reactions    Codeine     Plavix [clopidogrel]      Resistant to Plavix    Ramipril      Cough    Ibandronate Nausea And Vomiting         Review of Systems -   GEN ROS: negative for - fever  Breast ROS: negative for breast lumps  Genito-Urinary ROS: positive for - urinary frequency/urgency      Physical Examination:  BP (!) 140/68   Wt 73.6 kg (162 lb 4.1 oz)   BMI 29.68 kg/m²    Abd: non tender  V,v: pessary was in place, seated correctly  Pessary removed, Not replaced      Assessment:    1. Acute cystitis without hematuria        2. Bladder prolapse, female, acquired        3. Problem with vaginal pessary, subsequent encounter            Plan:  Pessary was removed  UTI being treated with Macrobid  Leave pessary out for now  F/u with Dr Reagan as planned  F/u after Urol procedure if needed

## 2023-09-16 ENCOUNTER — PATIENT MESSAGE (OUTPATIENT)
Dept: SURGERY | Facility: HOSPITAL | Age: 73
End: 2023-09-16
Payer: MEDICARE

## 2023-09-25 ENCOUNTER — ANESTHESIA EVENT (OUTPATIENT)
Dept: SURGERY | Facility: HOSPITAL | Age: 73
DRG: 748 | End: 2023-09-25
Payer: MEDICARE

## 2023-09-25 ENCOUNTER — TELEPHONE (OUTPATIENT)
Dept: UROLOGY | Facility: CLINIC | Age: 73
End: 2023-09-25
Payer: MEDICARE

## 2023-09-25 NOTE — ANESTHESIA PREPROCEDURE EVALUATION
[facility]  Anesthesia Pre-Operative Evaluation         Patient Name: Anne Roger  YOB: 1950  MRN: [id number]    SUBJECTIVE:     Pre-operative evaluation for Procedure(s) (LRB):  COLPORRHAPHY, ANTERIOR (N/A)  REPAIR, LIGAMENT, SACROSPINOUS (Right)  CYSTOSCOPY (N/A)     09/25/2023    Anne Roger is a 72 y.o. female w/ a significant PMHx of CAD, T2DM, patent foramen ovale with atrial septal aneurysm, CVA d/t patent foramen ovale 2014, SANAM, migraines, bladder prolapse s/p pessary placement failed in August.     Patient now presents for the above procedure(s).    Cardiac Stress Test 3/4/2022    Abnormal myocardial perfusion scan.    There is a mild intensity, small to moderate sized, reversible defect that is consistent with ischemia in the basal to mid lateral wall(s) in the typical distribution of the LCX territory.    There is a moderate intensity perfusion abnormality in the anteroseptal wall of the left ventricle, secondary to breast attenuation.    The EKG portion of this study is negative for ischemia.    The patient reported no chest pain during the stress test.    There were no arrhythmias during stress.    EF 77 %    TTE 2/5/2019:  · Patent foramen ovale vs small ASD noted with right to left shunting with   valsalva as indicated by saline contrast.  · Interatrial septal aneurysm present, particularly prominet on bubble   study with valsalva.  · Normal left ventricular systolic function. The estimated ejection   fraction is 55%  · Concentric left ventricular remodeling.  · Indeterminate left ventricular diastolic function.  · Normal right ventricular systolic function.  · Mild tricuspid regurgitation.  · Normal central venous pressure (3 mm Hg).  · The estimated PA systolic pressure is 31 mm Hg    LDA: None documented.       Prev airway: None documented.      Patient Active Problem List   Diagnosis    Hypercholesterolemia    Hypothyroidism    Osteopenia  (Improved From Previous Osteoporosis)    SANAM With Hypersomnia    Nocturia    Right Shoulder Pain With Inflammation On 11/14 MRI    Chronic fatigue    Migraines    BLE Varicose Veins    Bilateral Plantar Fasciitis    Hallux Limitus Right Foot > Left Foot    Coronary Artery Disease    Left Shoulder Arthropathy    Chronic Depression    Recurrent Dizziness Episodes    Bilateral Leg Paresthesias    Hypertension    Recurrent sinusitis    Patent Foramen Ovale With Atrial Septal Aneurym    Early Bilateral Cataracts    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    H/O CVA From PFO 04/2014 With No Sequelae    History of TIA (transient ischemic attack)    Plavix resistance    Borderline Vitamin D Deficiency    H/O Adenomatous Colon Polyp On Previous TC    Type 2 Diabetes Mellitus    Primary osteoarthritis of first carpometacarpal joint of left hand    Type 2 diabetes mellitus with circulatory disorder, without long-term current use of insulin    Bladder prolapse, female, acquired       Review of patient's allergies indicates:   Allergen Reactions    Codeine     Plavix [clopidogrel]      Resistant to Plavix    Ramipril      Cough    Ibandronate Nausea And Vomiting       Current Inpatient Medications:   doxycycline  100 mg Oral Once    LIDOcaine HCl 2%   Urethral Once       Current Facility-Administered Medications on File Prior to Encounter   Medication Dose Route Frequency Provider Last Rate Last Admin    doxycycline tablet 100 mg  100 mg Oral Once Kandy Reagan MD        LIDOcaine HCl 2% urojet   Urethral Once Kandy Reagan MD         Current Outpatient Medications on File Prior to Encounter   Medication Sig Dispense Refill    alendronate (FOSAMAX) 70 MG tablet Take 1 tablet (70 mg total) by mouth every 7 days. 12 tablet 4    aspirin (ECOTRIN) 81 MG EC tablet Take 1 tablet (81 mg total) by mouth once daily.  0    atorvastatin (LIPITOR) 40 MG tablet Take 1 tablet (40 mg total) by mouth every  evening. 90 tablet 3    calcium-vitamin D3 (OS-JAYLIN 500 + D3) 500 mg(1,250mg) -200 unit per tablet Take 1 tablet by mouth once daily. OSCAL 500/200 D-3 500-200 MG-UNIT TABS      cholecalciferol, vitamin D3, (VITAMIN D3) 2,000 unit Tab Take 1 tablet (2,000 Units total) by mouth once daily.      diabetic supplies, miscellan. Misc MICROLET LANCETS MISC      fluorouraciL (EFUDEX) 5 % cream APPLY TOPICALLY TO AFFECTED AREA ON THE RIGHT ARM 1 TO 2 TIMES PER DAY AS TOLERATED      FOLIC ACID/MULTIVIT-MIN/LUTEIN (CENTRUM SILVER ORAL) Take 1 tablet by mouth nightly.       glimepiride (AMARYL) 2 MG tablet TAKE 1 & 1/2 (ONE & ONE-HALF) TABLETS BY MOUTH TWICE DAILY 270 tablet 3    GLUC HCL/GLUC HUGHES/EX-MWB-S-GLUC (GLUCOSAMINE COMPLEX ORAL) Take 1 tablet by mouth once daily at 6am.       lancets (MICROLET LANCET) Misc USE 1  TO CHECK GLUCOSE TWICE DAILY 100 each 3    levothyroxine (SYNTHROID) 75 MCG tablet Take 75 mcg by mouth.      losartan (COZAAR) 100 MG tablet Take 1 tablet (100 mg total) by mouth once daily. 90 tablet 3    magnesium 30 mg Tab Take 1 capsule by mouth nightly. MAGNESIUM TABS      metFORMIN (GLUCOPHAGE-XR) 750 MG ER 24hr tablet Take 1 tablet (750 mg total) by mouth 2 (two) times daily. 180 tablet 3    mupirocin (BACTROBAN) 2 % ointment Apply topically 2 (two) times daily as needed. 30 g 0       Past Surgical History:   Procedure Laterality Date    DILATION AND CURETTAGE OF UTERUS      HYSTERECTOMY      OOPHORECTOMY      TONSILLECTOMY             OBJECTIVE:     Vital Signs Range (Last 24H):         Significant Labs:  Lab Results   Component Value Date    WBC 7.67 12/07/2019    HGB 13.6 12/07/2019    HCT 41.6 12/07/2019     12/07/2019    CHOL 124 09/12/2023    TRIG 78 09/12/2023    HDL 58 09/12/2023    ALT 28 09/12/2023    AST 31 09/12/2023     09/12/2023    K 4.3 09/12/2023     09/12/2023    CREATININE 0.63 09/12/2023    BUN 15 09/12/2023    CO2 26 09/12/2023    TSH 0.458  2023    INR 0.9 2017    HGBA1C 6.5 (H) 2023       Diagnostic Studies: No relevant studies.    EKG:   Results for orders placed or performed during the hospital encounter of 19   EKG 12-lead    Collection Time: 19 11:22 AM    Narrative                             [facility]                                                                                  Test Date:    2019  Pat Name:     JASON DON          Department:     Patient ID:   [id number]                 Room:         EXAM 08EXAM 08  Gender:       Female                   Technician:   PRECIOUS  :          1950               Requested By:   Order Number:                          Reading MD:   Lele Whalen                                   Measurements  Intervals                              Axis            Rate:         56                       P:            62  MO:           160                      QRS:          56  QRSD:         66                       T:            52  QT:           362                                      QTc:          349                                                                 Interpretive Statements  Sinus bradycardia  Low voltage QRS  Nonspecific T wave abnormality  Abnormal ECG  Compared to ECG 2017 06:37:41  Low QRS voltage now present  T-wave abnormality now present    Electronically Signed On 2019 17:43:26 CST by Lele Whalen               SEBASTIAN:  No results found for this or any previous visit.    ASSESSMENT/PLAN:         Pre-op Assessment    I have reviewed the Patient Summary Reports.     I have reviewed the Nursing Notes. I have reviewed the NPO Status.   I have reviewed the Medications.     Review of Systems  Anesthesia Hx:  No problems with previous Anesthesia  History of prior surgery of interest to airway management or planning: Denies Family Hx of Anesthesia complications.   Denies Personal Hx of Anesthesia complications.   Social:  Non-Smoker, No  Alcohol Use    Hematology/Oncology:         -- Denies Anemia:   EENT/Dental:EENT/Dental Normal   Cardiovascular:   Exercise tolerance: good Denies Hypertension.  Denies MI. CAD  asymptomatic  Denies Dysrhythmias.   Denies CHF. hyperlipidemia Patent foramen ovale with atrial septal aneurysm   Pulmonary:   Denies COPD.  Denies Asthma. Sleep Apnea (Uses a dental device)    Renal/:   Denies Chronic Renal Disease.     Hepatic/GI:   Denies GERD. Denies Liver Disease.    Musculoskeletal:   Arthritis     Neurological:   CVA (weakness in L UE and LLE, slightly more prominent in LLE), residual symptoms Headaches Denies Seizures.    Endocrine:   Diabetes, type 2 Hypothyroidism    Psych:   Psychiatric History depression          Physical Exam  General: Well nourished, Cooperative, Alert and Oriented    Airway:  Mallampati: I   Mouth Opening: Normal  TM Distance: Normal  Tongue: Normal  Neck ROM: Normal ROM    Dental:        Anesthesia Plan  Type of Anesthesia, risks & benefits discussed:    Anesthesia Type: Gen ETT, Gen Supraglottic Airway  Intra-op Monitoring Plan: Standard ASA Monitors  Post Op Pain Control Plan: multimodal analgesia and IV/PO Opioids PRN  Induction:  IV  Airway Plan: Direct and Video, Post-Induction  Informed Consent: Informed consent signed with the Patient and all parties understand the risks and agree with anesthesia plan.  All questions answered.   ASA Score: 3  Day of Surgery Review of History & Physical: H&P Update referred to the surgeon/provider.    Ready For Surgery From Anesthesia Perspective.     .

## 2023-09-25 NOTE — TELEPHONE ENCOUNTER
Good Morning. Your surgery will start at 8:30 am, but your arrival time is 6:30 am.   It will be on the [facility] Passed the gold elevators.  You will need for someone to drive you home because you will be under anaesthesia.  No eating or drinking after Midnight. Take a shower the night before and the morning of with a anti-bacterial soap, ( Dial Soap/ Hibiclens) Do not apply and deodorant, perfume, powder or body lotions the morning of surgery. Wear comfortable clothing, like loose fitting pants and a button down shirt. Leave all jewelry and valuables at home.   If you have any questions don't hesitate to call me. Kandy [phone]

## 2023-09-26 ENCOUNTER — HOSPITAL ENCOUNTER (INPATIENT)
Facility: HOSPITAL | Age: 73
LOS: 1 days | Discharge: HOME | DRG: 748 | End: 2023-09-27
Attending: UROLOGY | Admitting: UROLOGY
Payer: MEDICARE

## 2023-09-26 ENCOUNTER — ANESTHESIA (OUTPATIENT)
Dept: SURGERY | Facility: HOSPITAL | Age: 73
DRG: 748 | End: 2023-09-26
Payer: MEDICARE

## 2023-09-26 DIAGNOSIS — R35.1 NOCTURIA: ICD-10-CM

## 2023-09-26 DIAGNOSIS — N39.0 RECURRENT UTI: ICD-10-CM

## 2023-09-26 DIAGNOSIS — R35.0 INCREASED FREQUENCY OF URINATION: ICD-10-CM

## 2023-09-26 DIAGNOSIS — N81.10 FEMALE CYSTOCELE: Primary | ICD-10-CM

## 2023-09-26 LAB
POCT GLUCOSE: 127 MG/DL (ref 70–110)
POCT GLUCOSE: 133 MG/DL (ref 70–110)
POCT GLUCOSE: 257 MG/DL (ref 70–110)
POCT GLUCOSE: 273 MG/DL (ref 70–110)

## 2023-09-26 PROCEDURE — 25000003 PHARM REV CODE 250: Performed by: UROLOGY

## 2023-09-26 PROCEDURE — 37000009 HC ANESTHESIA EA ADD 15 MINS: Performed by: UROLOGY

## 2023-09-26 PROCEDURE — 63600175 PHARM REV CODE 636 W HCPCS: Performed by: STUDENT IN AN ORGANIZED HEALTH CARE EDUCATION/TRAINING PROGRAM

## 2023-09-26 PROCEDURE — 71000033 HC RECOVERY, INTIAL HOUR: Performed by: UROLOGY

## 2023-09-26 PROCEDURE — 25000003 PHARM REV CODE 250

## 2023-09-26 PROCEDURE — 57282 COLPOPEXY EXTRAPERITONEAL: CPT | Mod: ,,, | Performed by: UROLOGY

## 2023-09-26 PROCEDURE — 63600175 PHARM REV CODE 636 W HCPCS

## 2023-09-26 PROCEDURE — 71000015 HC POSTOP RECOV 1ST HR: Performed by: UROLOGY

## 2023-09-26 PROCEDURE — 25000003 PHARM REV CODE 250: Performed by: STUDENT IN AN ORGANIZED HEALTH CARE EDUCATION/TRAINING PROGRAM

## 2023-09-26 PROCEDURE — 99900035 HC TECH TIME PER 15 MIN (STAT)

## 2023-09-26 PROCEDURE — 27201423 OPTIME MED/SURG SUP & DEVICES STERILE SUPPLY: Performed by: UROLOGY

## 2023-09-26 PROCEDURE — 71000016 HC POSTOP RECOV ADDL HR: Performed by: UROLOGY

## 2023-09-26 PROCEDURE — 94799 UNLISTED PULMONARY SVC/PX: CPT

## 2023-09-26 PROCEDURE — 27000221 HC OXYGEN, UP TO 24 HOURS

## 2023-09-26 PROCEDURE — D9220A PRA ANESTHESIA: ICD-10-PCS | Mod: ,,, | Performed by: ANESTHESIOLOGY

## 2023-09-26 PROCEDURE — 57240 ANTERIOR COLPORRHAPHY: CPT | Mod: 51,,, | Performed by: UROLOGY

## 2023-09-26 PROCEDURE — 57240: ICD-10-PCS | Mod: 51,,, | Performed by: UROLOGY

## 2023-09-26 PROCEDURE — C2631 REP DEV, URINARY, W/O SLING: HCPCS | Performed by: UROLOGY

## 2023-09-26 PROCEDURE — 63600175 PHARM REV CODE 636 W HCPCS: Performed by: UROLOGY

## 2023-09-26 PROCEDURE — 37000008 HC ANESTHESIA 1ST 15 MINUTES: Performed by: UROLOGY

## 2023-09-26 PROCEDURE — 57282 PR REVAGINAL PROLAPSE,SACROSP LIG: ICD-10-PCS | Mod: ,,, | Performed by: UROLOGY

## 2023-09-26 PROCEDURE — 11000001 HC ACUTE MED/SURG PRIVATE ROOM

## 2023-09-26 PROCEDURE — 36000708 HC OR TIME LEV III 1ST 15 MIN: Performed by: UROLOGY

## 2023-09-26 PROCEDURE — D9220A PRA ANESTHESIA: Mod: ,,, | Performed by: ANESTHESIOLOGY

## 2023-09-26 PROCEDURE — 94761 N-INVAS EAR/PLS OXIMETRY MLT: CPT

## 2023-09-26 PROCEDURE — 82962 GLUCOSE BLOOD TEST: CPT | Performed by: UROLOGY

## 2023-09-26 PROCEDURE — 82962 GLUCOSE BLOOD TEST: CPT | Mod: 91 | Performed by: UROLOGY

## 2023-09-26 PROCEDURE — 36000709 HC OR TIME LEV III EA ADD 15 MIN: Performed by: UROLOGY

## 2023-09-26 RX ORDER — ASPIRIN 81 MG/1
81 TABLET ORAL DAILY
Status: DISCONTINUED | OUTPATIENT
Start: 2023-09-26 | End: 2023-09-27 | Stop reason: HOSPADM

## 2023-09-26 RX ORDER — IBUPROFEN 200 MG
16 TABLET ORAL
Status: DISCONTINUED | OUTPATIENT
Start: 2023-09-26 | End: 2023-09-27 | Stop reason: HOSPADM

## 2023-09-26 RX ORDER — FENTANYL CITRATE 50 UG/ML
25 INJECTION, SOLUTION INTRAMUSCULAR; INTRAVENOUS EVERY 5 MIN PRN
Status: DISCONTINUED | OUTPATIENT
Start: 2023-09-26 | End: 2023-09-26 | Stop reason: HOSPADM

## 2023-09-26 RX ORDER — LIDOCAINE HYDROCHLORIDE 20 MG/ML
INJECTION, SOLUTION EPIDURAL; INFILTRATION; INTRACAUDAL; PERINEURAL
Status: DISCONTINUED | OUTPATIENT
Start: 2023-09-26 | End: 2023-09-26

## 2023-09-26 RX ORDER — GLUCAGON 1 MG
1 KIT INJECTION
Status: DISCONTINUED | OUTPATIENT
Start: 2023-09-27 | End: 2023-09-27 | Stop reason: HOSPADM

## 2023-09-26 RX ORDER — INSULIN ASPART 100 [IU]/ML
0-10 INJECTION, SOLUTION INTRAVENOUS; SUBCUTANEOUS
Status: DISCONTINUED | OUTPATIENT
Start: 2023-09-27 | End: 2023-09-27 | Stop reason: HOSPADM

## 2023-09-26 RX ORDER — SODIUM CHLORIDE 0.9 % (FLUSH) 0.9 %
10 SYRINGE (ML) INJECTION
Status: DISCONTINUED | OUTPATIENT
Start: 2023-09-26 | End: 2023-09-26 | Stop reason: HOSPADM

## 2023-09-26 RX ORDER — DEXAMETHASONE SODIUM PHOSPHATE 4 MG/ML
INJECTION, SOLUTION INTRA-ARTICULAR; INTRALESIONAL; INTRAMUSCULAR; INTRAVENOUS; SOFT TISSUE
Status: DISCONTINUED | OUTPATIENT
Start: 2023-09-26 | End: 2023-09-26

## 2023-09-26 RX ORDER — IBUPROFEN 200 MG
24 TABLET ORAL
Status: DISCONTINUED | OUTPATIENT
Start: 2023-09-27 | End: 2023-09-27 | Stop reason: HOSPADM

## 2023-09-26 RX ORDER — IBUPROFEN 200 MG
16 TABLET ORAL
Status: DISCONTINUED | OUTPATIENT
Start: 2023-09-27 | End: 2023-09-27 | Stop reason: HOSPADM

## 2023-09-26 RX ORDER — LEVOTHYROXINE SODIUM 75 UG/1
75 TABLET ORAL
Status: DISCONTINUED | OUTPATIENT
Start: 2023-09-27 | End: 2023-09-27 | Stop reason: HOSPADM

## 2023-09-26 RX ORDER — ONDANSETRON 2 MG/ML
INJECTION INTRAMUSCULAR; INTRAVENOUS
Status: DISCONTINUED | OUTPATIENT
Start: 2023-09-26 | End: 2023-09-26

## 2023-09-26 RX ORDER — SODIUM CHLORIDE 9 MG/ML
INJECTION, SOLUTION INTRAVENOUS CONTINUOUS
Status: DISCONTINUED | OUTPATIENT
Start: 2023-09-26 | End: 2023-09-26

## 2023-09-26 RX ORDER — OXYCODONE HYDROCHLORIDE 5 MG/1
5 TABLET ORAL EVERY 4 HOURS PRN
Status: DISCONTINUED | OUTPATIENT
Start: 2023-09-27 | End: 2023-09-27 | Stop reason: HOSPADM

## 2023-09-26 RX ORDER — LOSARTAN POTASSIUM 50 MG/1
100 TABLET ORAL DAILY
Status: DISCONTINUED | OUTPATIENT
Start: 2023-09-26 | End: 2023-09-27 | Stop reason: HOSPADM

## 2023-09-26 RX ORDER — FERROUS SULFATE, DRIED 160(50) MG
1 TABLET, EXTENDED RELEASE ORAL DAILY
Status: DISCONTINUED | OUTPATIENT
Start: 2023-09-26 | End: 2023-09-27 | Stop reason: HOSPADM

## 2023-09-26 RX ORDER — AMPICILLIN 1 G/1
INJECTION, POWDER, FOR SOLUTION INTRAMUSCULAR; INTRAVENOUS
Status: DISCONTINUED | OUTPATIENT
Start: 2023-09-26 | End: 2023-09-26

## 2023-09-26 RX ORDER — PROPOFOL 10 MG/ML
VIAL (ML) INTRAVENOUS
Status: DISCONTINUED | OUTPATIENT
Start: 2023-09-26 | End: 2023-09-26

## 2023-09-26 RX ORDER — ROCURONIUM BROMIDE 10 MG/ML
INJECTION, SOLUTION INTRAVENOUS
Status: DISCONTINUED | OUTPATIENT
Start: 2023-09-26 | End: 2023-09-26

## 2023-09-26 RX ORDER — ACETAMINOPHEN 500 MG
1000 TABLET ORAL
Status: COMPLETED | OUTPATIENT
Start: 2023-09-26 | End: 2023-09-26

## 2023-09-26 RX ORDER — CHOLECALCIFEROL (VITAMIN D3) 25 MCG
2000 TABLET ORAL DAILY
Status: DISCONTINUED | OUTPATIENT
Start: 2023-09-26 | End: 2023-09-27 | Stop reason: HOSPADM

## 2023-09-26 RX ORDER — GENTAMICIN SULFATE 100 MG/100ML
240 INJECTION, SOLUTION INTRAVENOUS
Status: COMPLETED | OUTPATIENT
Start: 2023-09-26 | End: 2023-09-26

## 2023-09-26 RX ORDER — ONDANSETRON 2 MG/ML
4 INJECTION INTRAMUSCULAR; INTRAVENOUS EVERY 6 HOURS PRN
Status: DISCONTINUED | OUTPATIENT
Start: 2023-09-26 | End: 2023-09-27 | Stop reason: HOSPADM

## 2023-09-26 RX ORDER — OXYCODONE HYDROCHLORIDE 10 MG/1
10 TABLET ORAL EVERY 6 HOURS PRN
Status: DISCONTINUED | OUTPATIENT
Start: 2023-09-27 | End: 2023-09-27 | Stop reason: HOSPADM

## 2023-09-26 RX ORDER — OXYCODONE HCL 5 MG/5 ML
5 SOLUTION, ORAL ORAL EVERY 4 HOURS PRN
Status: DISCONTINUED | OUTPATIENT
Start: 2023-09-27 | End: 2023-09-26

## 2023-09-26 RX ORDER — IBUPROFEN 200 MG
24 TABLET ORAL
Status: DISCONTINUED | OUTPATIENT
Start: 2023-09-26 | End: 2023-09-27 | Stop reason: HOSPADM

## 2023-09-26 RX ORDER — OXYCODONE HYDROCHLORIDE 5 MG/1
5 TABLET ORAL EVERY 6 HOURS PRN
Status: DISCONTINUED | OUTPATIENT
Start: 2023-09-27 | End: 2023-09-26

## 2023-09-26 RX ORDER — HALOPERIDOL 5 MG/ML
0.5 INJECTION INTRAMUSCULAR EVERY 10 MIN PRN
Status: DISCONTINUED | OUTPATIENT
Start: 2023-09-26 | End: 2023-09-26 | Stop reason: HOSPADM

## 2023-09-26 RX ORDER — PHENYLEPHRINE HYDROCHLORIDE 10 MG/ML
INJECTION INTRAVENOUS
Status: DISCONTINUED | OUTPATIENT
Start: 2023-09-26 | End: 2023-09-26

## 2023-09-26 RX ORDER — POLYETHYLENE GLYCOL 3350 17 G/17G
17 POWDER, FOR SOLUTION ORAL ONCE
Status: COMPLETED | OUTPATIENT
Start: 2023-09-26 | End: 2023-09-26

## 2023-09-26 RX ORDER — FENTANYL CITRATE 50 UG/ML
INJECTION, SOLUTION INTRAMUSCULAR; INTRAVENOUS
Status: DISCONTINUED | OUTPATIENT
Start: 2023-09-26 | End: 2023-09-26

## 2023-09-26 RX ORDER — BUPIVACAINE HYDROCHLORIDE 2.5 MG/ML
INJECTION, SOLUTION EPIDURAL; INFILTRATION; INTRACAUDAL
Status: DISCONTINUED | OUTPATIENT
Start: 2023-09-26 | End: 2023-09-26 | Stop reason: HOSPADM

## 2023-09-26 RX ORDER — GLUCAGON 1 MG
1 KIT INJECTION
Status: DISCONTINUED | OUTPATIENT
Start: 2023-09-26 | End: 2023-09-27 | Stop reason: HOSPADM

## 2023-09-26 RX ORDER — LIDOCAINE HYDROCHLORIDE 20 MG/ML
JELLY TOPICAL ONCE
Status: DISCONTINUED | OUTPATIENT
Start: 2023-09-26 | End: 2023-09-27 | Stop reason: HOSPADM

## 2023-09-26 RX ORDER — EPHEDRINE SULFATE 50 MG/ML
INJECTION, SOLUTION INTRAVENOUS
Status: DISCONTINUED | OUTPATIENT
Start: 2023-09-26 | End: 2023-09-26

## 2023-09-26 RX ORDER — LIDOCAINE HYDROCHLORIDE 10 MG/ML
INJECTION, SOLUTION EPIDURAL; INFILTRATION; INTRACAUDAL; PERINEURAL
Status: DISCONTINUED | OUTPATIENT
Start: 2023-09-26 | End: 2023-09-26 | Stop reason: HOSPADM

## 2023-09-26 RX ORDER — ATORVASTATIN CALCIUM 40 MG/1
40 TABLET, FILM COATED ORAL NIGHTLY
Status: DISCONTINUED | OUTPATIENT
Start: 2023-09-26 | End: 2023-09-27 | Stop reason: HOSPADM

## 2023-09-26 RX ORDER — SODIUM CHLORIDE 0.9 % (FLUSH) 0.9 %
3 SYRINGE (ML) INJECTION EVERY 6 HOURS PRN
Status: DISCONTINUED | OUTPATIENT
Start: 2023-09-26 | End: 2023-09-27 | Stop reason: HOSPADM

## 2023-09-26 RX ADMIN — Medication 50 MG: at 10:09

## 2023-09-26 RX ADMIN — ROCURONIUM BROMIDE 20 MG: 10 INJECTION, SOLUTION INTRAVENOUS at 11:09

## 2023-09-26 RX ADMIN — ACETAMINOPHEN 1000 MG: 500 TABLET ORAL at 08:09

## 2023-09-26 RX ADMIN — SODIUM CHLORIDE, SODIUM GLUCONATE, SODIUM ACETATE, POTASSIUM CHLORIDE, MAGNESIUM CHLORIDE, SODIUM PHOSPHATE, DIBASIC, AND POTASSIUM PHOSPHATE: .53; .5; .37; .037; .03; .012; .00082 INJECTION, SOLUTION INTRAVENOUS at 11:09

## 2023-09-26 RX ADMIN — EPHEDRINE SULFATE 5 MG: 50 INJECTION INTRAVENOUS at 11:09

## 2023-09-26 RX ADMIN — INDIGOTINDISULFONATE SODIUM 40 MG: 8 INJECTION INTRAVENOUS at 12:09

## 2023-09-26 RX ADMIN — FENTANYL CITRATE 25 MCG: 50 INJECTION, SOLUTION INTRAMUSCULAR; INTRAVENOUS at 12:09

## 2023-09-26 RX ADMIN — Medication 1 TABLET: at 04:09

## 2023-09-26 RX ADMIN — ROCURONIUM BROMIDE 10 MG: 10 INJECTION, SOLUTION INTRAVENOUS at 12:09

## 2023-09-26 RX ADMIN — LOSARTAN POTASSIUM 100 MG: 50 TABLET, FILM COATED ORAL at 03:09

## 2023-09-26 RX ADMIN — FENTANYL CITRATE 25 MCG: 50 INJECTION INTRAMUSCULAR; INTRAVENOUS at 01:09

## 2023-09-26 RX ADMIN — GENTAMICIN SULFATE 240 MG: 40 INJECTION, SOLUTION INTRAMUSCULAR; INTRAVENOUS at 09:09

## 2023-09-26 RX ADMIN — ROCURONIUM BROMIDE 10 MG: 10 INJECTION, SOLUTION INTRAVENOUS at 10:09

## 2023-09-26 RX ADMIN — DEXAMETHASONE SODIUM PHOSPHATE 4 MG: 4 INJECTION, SOLUTION INTRAMUSCULAR; INTRAVENOUS at 10:09

## 2023-09-26 RX ADMIN — INSULIN ASPART 3 UNITS: 100 INJECTION, SOLUTION INTRAVENOUS; SUBCUTANEOUS at 11:09

## 2023-09-26 RX ADMIN — SODIUM CHLORIDE: 9 INJECTION, SOLUTION INTRAVENOUS at 08:09

## 2023-09-26 RX ADMIN — ONDANSETRON 4 MG: 2 INJECTION INTRAMUSCULAR; INTRAVENOUS at 11:09

## 2023-09-26 RX ADMIN — SUGAMMADEX 200 MG: 100 INJECTION, SOLUTION INTRAVENOUS at 12:09

## 2023-09-26 RX ADMIN — FENTANYL CITRATE 50 MCG: 50 INJECTION, SOLUTION INTRAMUSCULAR; INTRAVENOUS at 10:09

## 2023-09-26 RX ADMIN — Medication 150 MG: at 10:09

## 2023-09-26 RX ADMIN — EPHEDRINE SULFATE 5 MG: 50 INJECTION INTRAVENOUS at 10:09

## 2023-09-26 RX ADMIN — ROCURONIUM BROMIDE 40 MG: 10 INJECTION, SOLUTION INTRAVENOUS at 10:09

## 2023-09-26 RX ADMIN — ASPIRIN 81 MG: 81 TABLET, COATED ORAL at 03:09

## 2023-09-26 RX ADMIN — PHENYLEPHRINE HYDROCHLORIDE 100 MCG: 10 INJECTION INTRAVENOUS at 10:09

## 2023-09-26 RX ADMIN — LIDOCAINE HYDROCHLORIDE 60 MG: 20 INJECTION, SOLUTION EPIDURAL; INFILTRATION; INTRACAUDAL; PERINEURAL at 10:09

## 2023-09-26 RX ADMIN — PHENYLEPHRINE HYDROCHLORIDE 100 MCG: 10 INJECTION INTRAVENOUS at 11:09

## 2023-09-26 RX ADMIN — ATORVASTATIN CALCIUM 40 MG: 40 TABLET, FILM COATED ORAL at 08:09

## 2023-09-26 RX ADMIN — SODIUM CHLORIDE: 9 INJECTION, SOLUTION INTRAVENOUS at 10:09

## 2023-09-26 RX ADMIN — OXYCODONE HYDROCHLORIDE 10 MG: 10 TABLET ORAL at 11:09

## 2023-09-26 RX ADMIN — AMPICILLIN 1 G: 1 INJECTION, POWDER, FOR SOLUTION INTRAMUSCULAR; INTRAVENOUS at 10:09

## 2023-09-26 RX ADMIN — POLYETHYLENE GLYCOL 3350 17 G: 17 POWDER, FOR SOLUTION ORAL at 10:09

## 2023-09-26 NOTE — PROGRESS NOTES
Sent pharmacy text @ 0811.  Notified Yen in pharmacy, requested Gentamicin IV pre-op dose.  Stated will send asap.

## 2023-09-26 NOTE — OP NOTE
[facility]  Operative Note    Date: 09/26/2023    Pre-Op Diagnosis: anterior compartment prolapse    Post-Op Diagnosis: same    Procedure(s) Performed:   1. Anterior colporrhaphy  2. Cystoscopy    Specimen(s): none    Staff Surgeon: Kandy Reagan MD    Assistant Surgeon: Ricardo Leon MD    Anesthesia: General endotracheal anesthesia    Indications: Anne Roger is a 72 y.o. female with anterior compartment prolapse, electing for repair.     Findings:  Anterior colporrhaphy and sacrospinous ligament fixation without complication  No injury to bladder on cystoscopy. Clear blue efflux from bilateral UO    Estimated Blood Loss: minimal    Drains: 16 Fr lundy catheter    Procedure in detail:  After the risks and benefits of the procedure were explained, informed consent was obtained. The patient was taken to the operating suite and placed in the supine position.  General anesthesia was administered.  The patient was then placed in the dorsal lithotomy position and prepped and draped in the usual sterile fashion. Timeout was performed and pre-operative antibiotics were confirmed.    A 16 Fr lundy catheter was placed to empty the bladder.  The [place] retractor was assembled.  An allis clamp was placed on the vaginal epithelium between the urethra and the cystocele. 1% lidocaine and 0.25% marcaine was injected below the vaginal epithelium of the anterior vaginal wall to allow hydrodissection.  A 15 blade was used to make vertical midline incision in the anterior vaginal wall along the length of her cystocele. The vaginal epithelium was dissected off the pubocervical fascia sharply. This dissection was performed circumferentially and taken down to the apex of the prolapsed area on both sides. The cystocele was then reduced and the pubocervical fascia was reapproximated using 3-0 vicryl.  This restored the support of the anterior vaginal wall and reduced the prolapse.      We then performed  our sacrospinous ligament fixation.  The spinous process was palpated and cleared of the overlying soft tissue.  The sacrospinous ligament was then palpated on the patient's right. We used the Capio Slim to attach the 2-0 Ethibond suture to the ligament. We then palpation the location again to confirm both stitches were in the appropriate location prior to tying down the suture. We then secured this to the vaginal apex with three half hitches and there was adequate support.  Before tying down the SSL fixation sutures, the anterior wall was closed in an interrupted, vertical mattress fashion with 2-0 Vicryl for the most proximal portion of the closure.  The sutures were then tied down, lifting the apex.      The lundy was removed in standard sterile fashion.  The cystoscope was inserted into the bladder. The prolapse had been corrected.  Bilateral clear blue efflux was identified from the ureteral orifices.  The scope was removed and the lundy was replaced.    The remainder of the vaginal epithelium was closed with 2-0 vicryl in an interrupted vertical mattress fashion.  The vagina was packed with lubricated kerlex.      The patient tolerated the procedure well and was transferred to the recovery room in stable condition.     Disposition:  The patient will be admitted overnight for observation.  Plan for lundy removal and vaginal packing removal in AM.    MD DULCE Alfaro was present for the entire case and agree with the above note.

## 2023-09-26 NOTE — ANESTHESIA POSTPROCEDURE EVALUATION
Anesthesia Post Evaluation    Patient: Anne Roger    Procedure(s) Performed: Procedure(s) (LRB):  COLPORRHAPHY, ANTERIOR (N/A)  REPAIR, LIGAMENT, SACROSPINOUS (Right)  CYSTOSCOPY (N/A)    Final Anesthesia Type: general      Patient location during evaluation: PACU  Patient participation: Yes- Able to Participate  Level of consciousness: awake and alert  Post-procedure vital signs: reviewed and stable  Pain management: adequate  Airway patency: patent  SANAM mitigation strategies: Extubation while patient is awake and Multimodal analgesia  PONV status at discharge: No PONV  Anesthetic complications: no      Cardiovascular status: stable  Respiratory status: unassisted and spontaneous ventilation  Hydration status: euvolemic  Follow-up not needed.          Vitals Value Taken Time   /60 09/26/23 1346   Temp 36.3 °C (97.3 °F) 09/26/23 1315   Pulse 75 09/26/23 1353   Resp 12 09/26/23 1353   SpO2 98 % 09/26/23 1353   Vitals shown include unvalidated device data.      No case tracking events are documented in the log.      Pain/Reynaldo Score: Pain Rating Prior to Med Admin: 0 (9/26/2023  8:06 AM)  Reynaldo Score: 8 (9/26/2023  1:15 PM)

## 2023-09-26 NOTE — NURSING
Nurses Note -- 4 Eyes      9/26/2023   6:36 PM      Skin assessed during: Admit      [x] No Altered Skin Integrity Present    []Prevention Measures Documented      [] Yes- Altered Skin Integrity Present or Discovered   [] LDA Added if Not in Epic (Describe Wound)   [] New Altered Skin Integrity was Present on Admit and Documented in LDA   [] Wound Image Taken    Wound Care Consulted? No    Attending Nurse:  Nicci Coreas RN    Second RN/Staff Member:   Yane Coreas RN

## 2023-09-26 NOTE — NURSING
Patient resting quietly with  at bedside. All personal items and call bell within reach. All needs met throughout shift. No complaints noted.

## 2023-09-26 NOTE — ANESTHESIA PROCEDURE NOTES
Intubation    Date/Time: 9/26/2023 10:41 AM    Performed by: Jan Montalvo DO  Authorized by: Nathan Dubose MD    Intubation:     Induction:  Intravenous    Intubated:  Postinduction    Mask Ventilation:  Easy mask    Attempts:  1    Attempted By:  Resident anesthesiologist    Method of Intubation:  Video laryngoscopy    Blade:  Perez 3    Laryngeal View Grade: Grade I - full view of cords      Difficult Airway Encountered?: No      Complications:  None    Airway Device:  Oral endotracheal tube    Airway Device Size:  7.0    Style/Cuff Inflation:  Cuffed (inflated to minimal occlusive pressure)    Tube secured:  21    Secured at:  The teeth    Placement Verified By:  Capnometry and Revisualization with laryngoscopy    Complicating Factors:  None    Findings Post-Intubation:  BS equal bilateral and atraumatic/condition of teeth unchanged

## 2023-09-26 NOTE — H&P
[facility]  Pre-operative H&P Note  09/26/2023    CHIEF COMPLAINT:    Mrs. Roger is a 72 y.o. female presenting for a follow up on cystocele and recurrent UTI    PRESENTING ILLNESS:    Anne Roger is a 72 y.o. female who is presents with a history of recurrent UTI and a cystocele.  At her initial consultation with me she expressed not wanting to come to [facility] and I had given her Dr. Davis's name since she lives in [place]. She states she has gotten two more symptomatic bladder infections.  She manifests with dysuria, frequency with small volumes.     She also states she has nocturia every 3 to 3 1/2 hours.  She sometimes has frequency hourly.     SUDS revealed delayed sensation and low capacity. Cystoscopy confirmed cystocele.    No changes since cysto+SUDS. Presents today for anterior colporrhaphy.        REVIEW OF SYSTEMS:    Review of Systems   Constitutional: Negative.    HENT: Negative.     Eyes: Negative.    Respiratory: Negative.     Cardiovascular: Negative.    Gastrointestinal: Negative.    Genitourinary:  Positive for frequency and urgency.        Nocturia   Musculoskeletal: Negative.    Skin: Negative.    Neurological: Negative.    Endo/Heme/Allergies: Negative.    Psychiatric/Behavioral: Negative.         PATIENT HISTORY:    Past Medical History:   Diagnosis Date    a Coronary Artery Disease     Dr. Pepe Lisa; 5/31/17 [facility]/Angiogram = (See Report); On Medical Management Only    a Patent Foramen Ovale With Atrial Septal Aneurym ####    Dr. Pepe Lisa; Dr. Jackelin Montes; In 2016 Her Insurance Denied Coverage For Her Recommended PFO Closure Procedure    b Hypertension     1/18/19 Increased Losartan 12.5 Mg To 25 Mg Daily    c Hypercholesterolemia     d Type 2 Diabetes Mellitus     12/13/19 Referred To Dr. Radha Dietz; 6/10/19 Increased Metformin-XR To 750 Mg BID; On Metformin- Mg BID And Amaryl 2 Mg BID    e Hypothyroidism     f Osteopenia  "(Improved From Previous Osteoporosis)     On Fosamax 70 Mg qWeek, And OTC D3 2K IU Daily, And OTC CaCO3 500 Mg BID    i SANAM With Hypersomnia     Dr. Chari Caal; Patient Prefers A Dental Appliance    j H/O Adenomatous Colon Polyp On Previous TC ###    Dr. Donn Lopez (After Her 8/19/19 TC Was Free Of Polyps): "Repeat TC In 5 Yrs"    k Nocturia     09/2014 Referred To Dr. Kyler Eller     l Bilateral Plantar Fasciitis     Dr. Charan Godoy ([place]) Last Saw Her On 1/4/17    l Hallux Limitus Right Foot > Left Foot     Dr. Charan Godoy ([place]) Last Saw Her On 1/4/17    l Left Shoulder Arthropathy     l Left Thumb Base Osteoarthritis     8/18/22 Referred To Dr. Jhonathan Nance; 8/18/22 RXd Voltaren Topical    l Right Shoulder Pain With Inflammation On 11/14 MRI     Dr. Jess Fox; Received PT For This 11/2014    m Bilateral Leg Paresthesias     Dr. Jackelin Montes; 1/22/19 Brain MRI W/O Contrast = (See Report); 2/25/19 Brain And Neck MRA = Unremarkable (See Reports)    m Chronic Fatigue     10/17/16 CBC, TFTs (On Synthroid), Vitamin B12 Level, And Vitamin D3 Level = Normal;  imporving    m H/O CVA From PFO 04/2014 With No Sequelae ###    Dr. Jackelin Montes; Dr. Naranjo (Neurology); In 2016 Her Insurance Denied Coverage For Her Recommended PFO Closure Procedure    m Migraines     Dr. Jackelin Montes; Infrequent    n Chronic Depression     o Recurrent Dizziness Episodes ###    Dr. Jackelin Montes; 1/22/19 Brain MRI W/O Contrast = (See Report); 2/25/19 Brain And Neck MRA = Unremarkable (See Reports)    o Recurrent sinusitis     1/18/19 Referred To Dr. Perez Somers    p Early Bilateral Cataracts     Dr. 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 MORAIMA Marroquin    q BLE Varicose Veins     q Borderline Vitamin D Deficiency     12/13/19 RXd OTC D3 2K IU Daily    Wellness Visit 8/18/2022        Past Surgical History:   Procedure Laterality Date    DILATION AND CURETTAGE OF UTERUS      HYSTERECTOMY      OOPHORECTOMY      TONSILLECTOMY   "       Family History   Problem Relation Age of Onset    Depression Other     Diabetes Other     Thyroid disease Other     Lung cancer Other     Diabetes Mother     Blindness Mother     Endocrine tumor Mother     Lung cancer Father     Cancer Father         lung and brain    Anxiety disorder Sister     Deafness Sister     Deafness Brother     Cancer Sister         bladder    Valvular heart disease Sister        Social History     Socioeconomic History    Marital status:      Spouse name: Chinmay    Number of children: 1   Occupational History    Occupation: Sales   Tobacco Use    Smoking status: Never    Smokeless tobacco: Never   Substance and Sexual Activity    Alcohol use: Yes     Alcohol/week: 1.0 standard drink     Types: 1 Glasses of wine per week     Comment: occasionally    Drug use: No    Sexual activity: Not Currently     Partners: Male       Allergies:  Codeine, Plavix [clopidogrel], and Ramipril    Medications:  Outpatient Encounter Medications as of 6/26/2023   Medication Sig Dispense Refill    (Magic mouthwash) 1:1:1 diphenhydrAMINE(Benadryl) 12.5mg/5ml liq, aluminum & magnesium hydroxide-simethicone (Maalox), LIDOcaine viscous 2% Swish and spit 5 mLs every 8 (eight) hours as needed (sore throat). for mouth sores 90 mL 0    alendronate (FOSAMAX) 70 MG tablet Take 1 tablet (70 mg total) by mouth every 7 days. 12 tablet 4    aspirin (ECOTRIN) 81 MG EC tablet Take 1 tablet (81 mg total) by mouth once daily.  0    atorvastatin (LIPITOR) 40 MG tablet Take 1 tablet (40 mg total) by mouth every evening. 90 tablet 3    benzonatate (TESSALON) 200 MG capsule Take 1 capsule (200 mg total) by mouth 3 (three) times daily as needed for Cough. 30 capsule 0    blood sugar diagnostic (CONTOUR NEXT TEST STRIPS) Strp 1 each by Other route before breakfast. For diagnosis code E11.65 100 each 3    calcium-vitamin D3 (OS-JAYLIN 500 + D3) 500 mg(1,250mg) -200 unit per tablet Take 1 tablet by mouth 2 (two) times daily with  meals. OSCAL 500/200 D-3 500-200 MG-UNIT TABS      cholecalciferol, vitamin D3, (VITAMIN D3) 2,000 unit Tab Take 1 tablet (2,000 Units total) by mouth once daily.      diabetic supplies, miscellan. Misc MICROLET LANCETS MISC      fluorouraciL (EFUDEX) 5 % cream APPLY TOPICALLY TO AFFECTED AREA ON THE RIGHT ARM 1 TO 2 TIMES PER DAY AS TOLERATED      FOLIC ACID/MULTIVIT-MIN/LUTEIN (CENTRUM SILVER ORAL) Take 1 tablet by mouth nightly.       glimepiride (AMARYL) 2 MG tablet TAKE 1 & 1/2 (ONE & ONE-HALF) TABLETS BY MOUTH TWICE DAILY 270 tablet 3    GLUC HCL/GLUC HUGHES/PV-MDD-P-GLUC (GLUCOSAMINE COMPLEX ORAL) Take 1 tablet by mouth once daily at 6am.       KELP ORAL Take 1 tablet by mouth once daily at 6am. KELP TABS      ketorolac 0.5% (ACULAR) 0.5 % Drop Place 1 drop into both eyes 2 (two) times daily.      lancets (MICROLET LANCET) Misc USE 1  TO CHECK GLUCOSE TWICE DAILY 100 each 3    levothyroxine (SYNTHROID) 75 MCG tablet Take 75 mcg by mouth.      losartan (COZAAR) 100 MG tablet Take 1 tablet (100 mg total) by mouth once daily. 90 tablet 3    magnesium 30 mg Tab Take 1 capsule by mouth nightly. MAGNESIUM TABS      metFORMIN (GLUCOPHAGE-XR) 750 MG ER 24hr tablet Take 1 tablet (750 mg total) by mouth 2 (two) times daily. 180 tablet 3    mupirocin (BACTROBAN) 2 % ointment Apply topically 2 (two) times daily as needed. 30 g 0     No facility-administered encounter medications on file as of 6/26/2023.         PHYSICAL EXAMINATION:    The patient generally appears in good health, is appropriately interactive, and is in no apparent distress.    Skin: No lesions.    Mental: Cooperative with normal affect.    Neuro: Grossly intact.    HEENT: Normal. No evidence of lymphadenopathy.    Chest:  normal inspiratory effort.    Abdomen: Soft, non-tender. No masses or organomegaly. Bladder is not palpable. No evidence of flank discomfort. No evidence of inguinal hernia.    Extremities: No clubbing, cyanosis, or edema    Normal  external female genitalia  Urethral meatus is normal  Urethra and bladder are nontender to bimanual exam  Well supported posteriorly   Stage II cystocele  Uterus and cervix are surgically absent  No adnexal masses  PVR by catheterization was 120 ml    LABS:    Lab Results   Component Value Date    BUN 20 (H) 03/17/2023    CREATININE 0.76 03/17/2023       UA 1.015, pH 5, otherwise, negative.     IMPRESSION:    Recurrent UTI  Cystocele  Incomplete bladder emptying    PLAN:    To OR for anterior colporrhaphy, cystoscopy  Consent previously obtained by Dr. Reagan  All questions and concerns addressed  Ricardo Leon MD  [facility] - PGY3    Patient seen in holding.  No changes in clinical condition.  Proceed with planned procedure.

## 2023-09-26 NOTE — BRIEF OP NOTE
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 Clear blue efflux from bilateral UO    Estimated Blood Loss:   ml         Specimens:   Specimen (24h ago, onward)      None            [id number]    As above.

## 2023-09-26 NOTE — PLAN OF CARE
Problem: Adult Inpatient Plan of Care  Goal: Patient-Specific Goal (Individualized)  Outcome: Ongoing, Progressing  Goal: Absence of Hospital-Acquired Illness or Injury  Outcome: Ongoing, Progressing     Problem: Diabetes Comorbidity  Goal: Blood Glucose Level Within Targeted Range  Outcome: Ongoing, Progressing     Problem: Infection  Goal: Absence of Infection Signs and Symptoms  Outcome: Ongoing, Progressing

## 2023-09-26 NOTE — NURSING TRANSFER
Nursing Transfer Note      9/26/2023   5:28 PM    Nurse giving handoff:Jett   Nurse receiving handoff:Nicci     Reason patient is being transferred: Post op    Transfer To: 522    Transfer via bed    Transfer with None    Transported by Pt transport     Transfer Vital Signs:See Flowsheet       Telemetry: None   Order for Tele Monitor? No    Additional Lines: Ortiz Catheter    4eyes on Skin: yes    Medicines sent: None    Any special needs or follow-up needed: None    Patient belongings transferred with patient: Yes    Chart send with patient: Yes    Notified: spouse    Patient reassessed at: 9/26/2023 1715  1  Upon arrival to floor: patient oriented to room, call bell in reach, and bed in lowest position

## 2023-09-26 NOTE — TRANSFER OF CARE
"Anesthesia Transfer of Care Note    Patient: Anne Roger    Procedure(s) Performed: Procedure(s) (LRB):  COLPORRHAPHY, ANTERIOR (N/A)  REPAIR, LIGAMENT, SACROSPINOUS (Right)  CYSTOSCOPY (N/A)    Patient location: PACU    Anesthesia Type: general    Transport from OR: Transported from OR on 6-10 L/min O2 by face mask with adequate spontaneous ventilation    Post pain: adequate analgesia    Post assessment: no apparent anesthetic complications and tolerated procedure well    Post vital signs: stable    Level of consciousness: awake, alert and oriented    Nausea/Vomiting: no nausea/vomiting    Complications: none    Transfer of care protocol was followed      Last vitals:   Visit Vitals  BP (!) 176/77   Pulse 68   Temp 36.5 °C (97.7 °F) (Oral)   Resp 16   Ht 5' 2" (1.575 m)   Wt 73.6 kg (162 lb 4.1 oz)   SpO2 98%   Breastfeeding No   BMI 29.68 kg/m²     "

## 2023-09-27 VITALS
RESPIRATION RATE: 18 BRPM | WEIGHT: 162.25 LBS | TEMPERATURE: 98 F | HEART RATE: 73 BPM | DIASTOLIC BLOOD PRESSURE: 61 MMHG | BODY MASS INDEX: 29.86 KG/M2 | HEIGHT: 62 IN | OXYGEN SATURATION: 98 % | SYSTOLIC BLOOD PRESSURE: 125 MMHG

## 2023-09-27 LAB
ANION GAP SERPL CALC-SCNC: 9 MMOL/L (ref 8–16)
BASOPHILS # BLD AUTO: 0.02 K/UL (ref 0–0.2)
BASOPHILS NFR BLD: 0.2 % (ref 0–1.9)
BUN SERPL-MCNC: 13 MG/DL (ref 8–23)
CALCIUM SERPL-MCNC: 8.6 MG/DL (ref 8.7–10.5)
CHLORIDE SERPL-SCNC: 107 MMOL/L (ref 95–110)
CO2 SERPL-SCNC: 23 MMOL/L (ref 23–29)
CREAT SERPL-MCNC: 0.8 MG/DL (ref 0.5–1.4)
DIFFERENTIAL METHOD: ABNORMAL
EOSINOPHIL # BLD AUTO: 0 K/UL (ref 0–0.5)
EOSINOPHIL NFR BLD: 0 % (ref 0–8)
ERYTHROCYTE [DISTWIDTH] IN BLOOD BY AUTOMATED COUNT: 13.2 % (ref 11.5–14.5)
EST. GFR  (NO RACE VARIABLE): >60 ML/MIN/1.73 M^2
GLUCOSE SERPL-MCNC: 184 MG/DL (ref 70–110)
HCT VFR BLD AUTO: 40.5 % (ref 37–48.5)
HGB BLD-MCNC: 13.2 G/DL (ref 12–16)
IMM GRANULOCYTES # BLD AUTO: 0.06 K/UL (ref 0–0.04)
IMM GRANULOCYTES NFR BLD AUTO: 0.5 % (ref 0–0.5)
LYMPHOCYTES # BLD AUTO: 2.3 K/UL (ref 1–4.8)
LYMPHOCYTES NFR BLD: 17.9 % (ref 18–48)
MCH RBC QN AUTO: 29 PG (ref 27–31)
MCHC RBC AUTO-ENTMCNC: 32.6 G/DL (ref 32–36)
MCV RBC AUTO: 89 FL (ref 82–98)
MONOCYTES # BLD AUTO: 0.9 K/UL (ref 0.3–1)
MONOCYTES NFR BLD: 7.3 % (ref 4–15)
NEUTROPHILS # BLD AUTO: 9.5 K/UL (ref 1.8–7.7)
NEUTROPHILS NFR BLD: 74.1 % (ref 38–73)
NRBC BLD-RTO: 0 /100 WBC
PLATELET # BLD AUTO: 335 K/UL (ref 150–450)
PMV BLD AUTO: 9.8 FL (ref 9.2–12.9)
POCT GLUCOSE: 178 MG/DL (ref 70–110)
POTASSIUM SERPL-SCNC: 3.6 MMOL/L (ref 3.5–5.1)
RBC # BLD AUTO: 4.55 M/UL (ref 4–5.4)
SODIUM SERPL-SCNC: 139 MMOL/L (ref 136–145)
WBC # BLD AUTO: 12.74 K/UL (ref 3.9–12.7)

## 2023-09-27 PROCEDURE — 80048 BASIC METABOLIC PNL TOTAL CA: CPT | Performed by: STUDENT IN AN ORGANIZED HEALTH CARE EDUCATION/TRAINING PROGRAM

## 2023-09-27 PROCEDURE — 25000003 PHARM REV CODE 250

## 2023-09-27 PROCEDURE — 63600175 PHARM REV CODE 636 W HCPCS

## 2023-09-27 PROCEDURE — 25000003 PHARM REV CODE 250: Performed by: STUDENT IN AN ORGANIZED HEALTH CARE EDUCATION/TRAINING PROGRAM

## 2023-09-27 PROCEDURE — 36415 COLL VENOUS BLD VENIPUNCTURE: CPT | Performed by: STUDENT IN AN ORGANIZED HEALTH CARE EDUCATION/TRAINING PROGRAM

## 2023-09-27 PROCEDURE — 85025 COMPLETE CBC W/AUTO DIFF WBC: CPT | Performed by: STUDENT IN AN ORGANIZED HEALTH CARE EDUCATION/TRAINING PROGRAM

## 2023-09-27 RX ORDER — OXYCODONE HYDROCHLORIDE 5 MG/1
5 TABLET ORAL EVERY 6 HOURS PRN
Qty: 12 TABLET | Refills: 0 | Status: SHIPPED | OUTPATIENT
Start: 2023-09-27 | End: 2023-12-09

## 2023-09-27 RX ORDER — IBUPROFEN 800 MG/1
800 TABLET ORAL EVERY 6 HOURS PRN
Qty: 20 TABLET | Refills: 0 | Status: SHIPPED | OUTPATIENT
Start: 2023-09-27

## 2023-09-27 RX ORDER — POLYETHYLENE GLYCOL 3350 17 G/17G
17 POWDER, FOR SOLUTION ORAL DAILY
Qty: 510 G | Refills: 0 | Status: SHIPPED | OUTPATIENT
Start: 2023-09-27 | End: 2023-12-09

## 2023-09-27 RX ORDER — DEXTROMETHORPHAN HYDROBROMIDE, GUAIFENESIN 5; 100 MG/5ML; MG/5ML
650 LIQUID ORAL EVERY 8 HOURS
Qty: 20 TABLET | Refills: 0 | Status: SHIPPED | OUTPATIENT
Start: 2023-09-27 | End: 2023-10-04

## 2023-09-27 RX ORDER — SULFAMETHOXAZOLE AND TRIMETHOPRIM 800; 160 MG/1; MG/1
1 TABLET ORAL 2 TIMES DAILY
Qty: 6 TABLET | Refills: 0 | Status: SHIPPED | OUTPATIENT
Start: 2023-09-27 | End: 2023-09-30

## 2023-09-27 RX ADMIN — ASPIRIN 81 MG: 81 TABLET, COATED ORAL at 08:09

## 2023-09-27 RX ADMIN — OXYCODONE HYDROCHLORIDE 5 MG: 5 TABLET ORAL at 05:09

## 2023-09-27 RX ADMIN — CHOLECALCIFEROL TAB 25 MCG (1000 UNIT) 2000 UNITS: 25 TAB at 08:09

## 2023-09-27 RX ADMIN — LOSARTAN POTASSIUM 100 MG: 50 TABLET, FILM COATED ORAL at 08:09

## 2023-09-27 RX ADMIN — Medication 1 TABLET: at 08:09

## 2023-09-27 RX ADMIN — LEVOTHYROXINE SODIUM 75 MCG: 75 TABLET ORAL at 05:09

## 2023-09-27 NOTE — PROGRESS NOTES
[facility]  Urology  Progress Note    Patient Name: Anne Roger  MRN: [id number]  Admission Date: 9/26/2023  Hospital Length of Stay: 0 days  Code Status: Full Code   Attending Provider: Kandy Reagan MD   Primary Care Physician: Radha Dietz MD    Subjective:     HPI:  Anne Roger is a 72 y.o. female with anterior compartment prolapse, electing for repair.       Interval History: AFVSS. Pain improved with medications. Vaginal packing and Ortiz removed this am. Received miralax, no bm.    Uop: 400/2750, clear yellow    Objective:     Temp:  [97 °F (36.1 °C)-98.2 °F (36.8 °C)] 97.3 °F (36.3 °C)  Pulse:  [68-93] 73  Resp:  [12-20] 17  SpO2:  [92 %-100 %] 95 %  BP: (127-176)/() 130/63     Body mass index is 29.67 kg/m².           Drains       Drain  Duration                  Urethral Catheter 09/26/23 1058 Straight-tip;Silicone;Double-lumen 16 Fr. <1 day                     Physical Exam  Vitals and nursing note reviewed.   Constitutional:       General: She is not in acute distress.  HENT:      Head: Atraumatic.      Nose: Nose normal.   Eyes:      Extraocular Movements: Extraocular movements intact.   Cardiovascular:      Rate and Rhythm: Normal rate.   Pulmonary:      Effort: Pulmonary effort is normal.   Abdominal:      General: Abdomen is flat. There is no distension.      Tenderness: There is no abdominal tenderness. There is no right CVA tenderness or left CVA tenderness.   Genitourinary:     Comments: Ortiz draining clear yellow prior to removal  Vaginal packing removed with minimal dried blood  Musculoskeletal:         General: Normal range of motion.      Cervical back: Normal range of motion.   Skin:     Coloration: Skin is not jaundiced.   Neurological:      General: No focal deficit present.      Mental Status: She is alert and oriented to person, place, and time.   Psychiatric:         Mood and Affect: Mood normal.         Behavior: Behavior normal.           Significant  "Labs:    BMP:  Recent Labs   Lab 09/27/23  0410      K 3.6      CO2 23   BUN 13   CREATININE 0.8   CALCIUM 8.6*       CBC:   Recent Labs   Lab 09/27/23  0410   WBC 12.74*   HGB 13.2   HCT 40.5          Blood Culture: No results for input(s): "LABBLOO" in the last 168 hours.  Urine Culture: No results for input(s): "LABURIN" in the last 168 hours.  Urine Studies: No results for input(s): "COLORU", "APPEARANCEUA", "PHUR", "SPECGRAV", "PROTEINUA", "GLUCUA", "KETONESU", "BILIRUBINUA", "OCCULTUA", "NITRITE", "UROBILINOGEN", "LEUKOCYTESUR", "RBCUA", "WBCUA", "BACTERIA", "SQUAMEPITHEL", "HYALINECASTS" in the last 168 hours.    Invalid input(s): "WRIGHTSUR"    Significant Imaging:  All pertinent imaging results/findings from the past 24 hours have been reviewed.        Assessment/Plan:     * Female cystocele  71 yo female with above comorbidities. Underwent anterior colporrhaphy 9/26/23.    - Doing well POD1  - Ortiz removed, voiding trial  - Tolerating po diet, no nausea/vomiting  - Ambulated halls  - Received miralax, no BM  - Pain well controlled with medications    Dispo: discharge today after voiding trial        VTE Risk Mitigation (From admission, onward)    None          Ricardo Leon MD  Urology  [facility] - Surgery  "

## 2023-09-27 NOTE — SUBJECTIVE & OBJECTIVE
"Interval History: AFVSS. Pain improved with medications. Vaginal packing and Ortiz removed this am. Received miralax, no bm.    Uop: 400/2750, clear yellow    Objective:     Temp:  [97 °F (36.1 °C)-98.2 °F (36.8 °C)] 97.3 °F (36.3 °C)  Pulse:  [68-93] 73  Resp:  [12-20] 17  SpO2:  [92 %-100 %] 95 %  BP: (127-176)/() 130/63     Body mass index is 29.67 kg/m².           Drains       Drain  Duration                  Urethral Catheter 09/26/23 1058 Straight-tip;Silicone;Double-lumen 16 Fr. <1 day                     Physical Exam  Vitals and nursing note reviewed.   Constitutional:       General: She is not in acute distress.  HENT:      Head: Atraumatic.      Nose: Nose normal.   Eyes:      Extraocular Movements: Extraocular movements intact.   Cardiovascular:      Rate and Rhythm: Normal rate.   Pulmonary:      Effort: Pulmonary effort is normal.   Abdominal:      General: Abdomen is flat. There is no distension.      Tenderness: There is no abdominal tenderness. There is no right CVA tenderness or left CVA tenderness.   Genitourinary:     Comments: Ortiz draining clear yellow prior to removal  Vaginal packing removed with minimal dried blood  Musculoskeletal:         General: Normal range of motion.      Cervical back: Normal range of motion.   Skin:     Coloration: Skin is not jaundiced.   Neurological:      General: No focal deficit present.      Mental Status: She is alert and oriented to person, place, and time.   Psychiatric:         Mood and Affect: Mood normal.         Behavior: Behavior normal.           Significant Labs:    BMP:  Recent Labs   Lab 09/27/23  0410      K 3.6      CO2 23   BUN 13   CREATININE 0.8   CALCIUM 8.6*       CBC:   Recent Labs   Lab 09/27/23  0410   WBC 12.74*   HGB 13.2   HCT 40.5          Blood Culture: No results for input(s): "LABBLOO" in the last 168 hours.  Urine Culture: No results for input(s): "LABURIN" in the last 168 hours.  Urine Studies: No results " "for input(s): "COLORU", "APPEARANCEUA", "PHUR", "SPECGRAV", "PROTEINUA", "GLUCUA", "KETONESU", "BILIRUBINUA", "OCCULTUA", "NITRITE", "UROBILINOGEN", "LEUKOCYTESUR", "RBCUA", "WBCUA", "BACTERIA", "SQUAMEPITHEL", "HYALINECASTS" in the last 168 hours.    Invalid input(s): "WRIGHTSUR"    Significant Imaging:  All pertinent imaging results/findings from the past 24 hours have been reviewed.    "

## 2023-09-27 NOTE — PLAN OF CARE
I have reviewed the chart of Anne Roger and collaborated with Kandy Reagan MD in the care of the patient who is hospitalized for the following:    Active Hospital Problems    Diagnosis    *Female cystocele    Type 2 Diabetes Mellitus    Hypothyroidism          I have reviewed the Anne Roger with the multidisciplinary team during discharge huddle.       Jackelin Thornton PA-C  Unit [facility]

## 2023-09-27 NOTE — DISCHARGE SUMMARY
[facility]  Urology  Discharge Summary      Patient Name: Anne Roger  MRN: [id number]  Admission Date: 9/26/2023  Hospital Length of Stay: 0 days  Discharge Date and Time:  09/27/2023 6:22 AM  Attending Physician: Kandy Reagan MD   Discharging Provider: Ricardo Leon MD  Primary Care Physician: Radha Dietz MD    HPI:   Anne Roger is a 72 y.o. female with anterior compartment prolapse, electing for repair.       Procedure(s) (LRB):  COLPORRHAPHY, ANTERIOR (N/A)  REPAIR, LIGAMENT, SACROSPINOUS (Right)  CYSTOSCOPY (N/A)     Indwelling Lines/Drains at time of discharge:   Lines/Drains/Airways       None                   Hospital Course (synopsis of major diagnoses, care, treatment, and services provided during the course of the hospital stay): The patient tolerated the procedure well. She was transferred to recovery post-op and then to the floor. Once on the floor her diet was advanced and she ambulated the night of surgery. On POD 1, she was tolerating a regular diet and ambulating without difficulty. Her  pain was well controlled. She was draining clear urine. Her vaginal packing and lundy were removed on rounds. There was minimal dried blood on the vaginal packing and no evidence of any active bleeding. After voiding, she was then stable for discharge to home.      Goals of Care Treatment Preferences:  Code Status: Full Code      Consults: None    Significant Diagnostic Studies: None    Pending Diagnostic Studies:       None            Final Active Diagnoses:    Diagnosis Date Noted POA    PRINCIPAL PROBLEM:  Female cystocele [N81.10] 12/16/2022 Yes      Problems Resolved During this Admission:         Discharged Condition: good    Disposition: Home or Self Care    Follow Up:   Follow-up Information       Kandy Reagan MD Follow up in 2 week(s).    Specialty: Urology  Why: Post-Op Follow-Up  Contact information:  [facility]  [facility] [id number] [phone]                            Patient Instructions:      Notify your health care provider if you experience any of the following:  temperature >100.4     Notify your health care provider if you experience any of the following:  persistent nausea and vomiting or diarrhea     Notify your health care provider if you experience any of the following:  severe uncontrolled pain     Notify your health care provider if you experience any of the following:  redness, tenderness, or signs of infection (pain, swelling, redness, odor or green/yellow discharge around incision site)     Notify your health care provider if you experience any of the following:  worsening rash     Notify your health care provider if you experience any of the following:  persistent dizziness, light-headedness, or visual disturbances     Medications:  Reconciled Home Medications:      Medication List        START taking these medications      acetaminophen 650 MG Tbsr  Commonly known as: TYLENOL  Take 1 tablet (650 mg total) by mouth every 8 (eight) hours. for 20 doses     ibuprofen 800 MG tablet  Commonly known as: ADVIL,MOTRIN  Take 1 tablet (800 mg total) by mouth every 6 (six) hours as needed for Pain.     oxyCODONE 5 MG immediate release tablet  Commonly known as: ROXICODONE  Take 1 tablet (5 mg total) by mouth every 6 (six) hours as needed for Pain.     polyethylene glycol 17 gram/dose powder  Commonly known as: GLYCOLAX  Take 17 g by mouth once daily.     sulfamethoxazole-trimethoprim 800-160mg 800-160 mg Tab  Commonly known as: BACTRIM DS  Take 1 tablet by mouth 2 (two) times daily. for 3 days            CONTINUE taking these medications      alendronate 70 MG tablet  Commonly known as: FOSAMAX  Take 1 tablet (70 mg total) by mouth every 7 days.     aspirin 81 MG EC tablet  Commonly known as: ECOTRIN  Take 1 tablet (81 mg total) by mouth once daily.     atorvastatin 40 MG tablet  Commonly known as: LIPITOR  Take 1 tablet (40 mg total) by mouth every  evening.     calcium-vitamin D3 500 mg-5 mcg (200 unit) per tablet  Commonly known as: OS-JAYLIN 500 + D3  Take 1 tablet by mouth once daily. OSCAL 500/200 D-3 500-200 MG-UNIT TABS     CENTRUM SILVER ORAL  Take 1 tablet by mouth nightly.     cholecalciferol (vitamin D3) 50 mcg (2,000 unit) Tab  Commonly known as: VITAMIN D3  Take 1 tablet (2,000 Units total) by mouth once daily.     CONTOUR NEXT TEST STRIPS Strp  Generic drug: blood sugar diagnostic  USE 1 STRIP TO CHECK GLUCOSE BEFORE BREAKFAST     diabetic supplies, miscellan. Misc  MICROLET LANCETS MISC     glimepiride 2 MG tablet  Commonly known as: AMARYL  TAKE 1 & 1/2 (ONE & ONE-HALF) TABLETS BY MOUTH TWICE DAILY     GLUCOSAMINE COMPLEX ORAL  Take 1 tablet by mouth once daily at 6am.     lancets Misc  Commonly known as: MICROLET LANCET  USE 1  TO CHECK GLUCOSE TWICE DAILY     levothyroxine 75 MCG tablet  Commonly known as: SYNTHROID  Take 75 mcg by mouth.     losartan 100 MG tablet  Commonly known as: COZAAR  Take 1 tablet (100 mg total) by mouth once daily.     magnesium 30 mg Tab  Take 1 capsule by mouth nightly. MAGNESIUM TABS     metFORMIN 750 MG ER 24hr tablet  Commonly known as: GLUCOPHAGE-XR  Take 1 tablet (750 mg total) by mouth 2 (two) times daily.     mupirocin 2 % ointment  Commonly known as: BACTROBAN  Apply topically 2 (two) times daily as needed.              Time spent on the discharge of patient: 10 minutes    Ricardo Leon MD  Urology  [facility] - Surgery    As above.

## 2023-09-27 NOTE — PLAN OF CARE
[facility]  Discharge Final Note    Primary Care Provider: Radha Dietz MD    Expected Discharge Date: 9/27/2023    Final Discharge Note (most recent)       Final Note - 09/27/23 1220          Final Note    Assessment Type Final Discharge Note     Anticipated Discharge Disposition Home or Self Care     Hospital Resources/Appts/Education Provided Post-Acute resouces added to AVS                     Important Message from Medicare             Contact Info       Kandy Reagan MD   Specialty: Urology   Relationship: Consulting Physician    [facility]  [facility] [address]   Phone: [phone]       Next Steps: Follow up in 2 week(s)    Instructions: Post-Op Follow-Up          Future Appointments   Date Time Provider Department Center   2/19/2024  1:00 PM Pepe Hurst MD [facility]   2/26/2024  2:00 PM Chas Cullen MD [facility] [place]     Patient discharged home to care of family on 9/27/23.      Nanda Srivastava RNCM  Case Management  [facility]  [phone]

## 2023-09-27 NOTE — PLAN OF CARE
Pt resting in bed. AAOx4. VSS, pt c/o perineum discomfort/pain. Notified on-call urology MD for pain med request, see new order. Lung sounds clear, room air. Vaginal packing in place. Ortiz draining to gravity, light blue to clear yellow urine noted. Scheduled meds given, see MAR. BG checked, notified on-call urology MD for sliding scale requirements see new orders. Pt expressed concern of using sliding scale insulin, educated on usage and hospital protocol; pt stated verbal understanding. SCD in place. Safety maintained, pt ambulated in [place] with standby assist. No needs voiced, call light within reach. Report prepared for oncoming nurse, plan of care continues.     Problem: Adult Inpatient Plan of Care  Goal: Plan of Care Review  Outcome: Ongoing, Progressing  Goal: Absence of Hospital-Acquired Illness or Injury  Outcome: Ongoing, Progressing  Goal: Optimal Comfort and Wellbeing  Outcome: Ongoing, Progressing     Problem: Diabetes Comorbidity  Goal: Blood Glucose Level Within Targeted Range  Outcome: Ongoing, Progressing     Problem: Infection  Goal: Absence of Infection Signs and Symptoms  Outcome: Ongoing, Progressing

## 2023-09-27 NOTE — ASSESSMENT & PLAN NOTE
71 yo female with above comorbidities. Underwent anterior colporrhaphy 9/26/23.    - Doing well POD1  - Ortiz removed, voiding trial  - Tolerating po diet, no nausea/vomiting  - Ambulated halls  - Received miralax, no BM  - Pain well controlled with medications    Dispo: discharge today after voiding trial

## 2023-09-27 NOTE — NURSING
Pt and spouse given discharge instructions. Pt and spouse verbalize an understanding of discharge instructions. Pt has voided twice since her vaginal packing and lundy were removed. Pt's urine is clear yellow with a minimal amount of blood on toilet paper. Pt has ambulated three times in the hallway this morning with spouse. Pt and spouse are aware of postop follow up appointment and plan of care.

## 2023-09-27 NOTE — DISCHARGE INSTRUCTIONS
Post Op Instructions for Pelvic Organ Prolapse Surgery  [facility]  Updated June 2023    RECOVERY ROOM  You will likely awaken with a vaginal packing in place.  This will feel like a large tampon.  If you have a catheter and feel the need to urinate, relax and let the urine flow.  If you do not have a catheter and feel the need to urinate, please let the recovery room nurse know so they can either assist you to the bathroom or provide you with a bedpan.  You may see blue or green urine after surgery.  This is from a dye that is given to you during surgery and will clear within 24 hours.      ACTIVITY  The first six weeks is a critical time period for wound healing. Scar tissue formation is necessary for future vaginal support.    After receiving an anesthetic you may feel sleepy or nauseated.  It is best to have little activity for the first 24-48 hours.  Gradually increase activity as tolerated.  No heavy lifting (nothing greater than 10 pounds or a gallon of milk) for 6 weeks after surgery.  Pelvic rest (no sexual intercourse, douching or tampons) for 6 weeks.  It is OK to walk around the house.  Avoid riding a bicycle or putting similar pressure over the groin.  No strenuous workouts for 6 weeks.    Do not drive for the first 48 hours or if you are taking narcotic pain medication.  Before driving, be sure you can press the brakes without difficulty or pain.    WOUND CARE INSTRUCTIONS  You may have small incisions that were closed with a medical grade superglue.  You may wash these and pat them dry after 48 hours  Do not submerge the incisions in water, (i.e. no bathtubs, swimming or hot tubs) for 6 weeks.  You may shower after 48 hours.    It is normal to have some light vaginal bleeding which should gradually resolve over the next 7-10 days.  This will look like a light period. Do not insert a tampon. Pads or panty liners are okay to avoid staining your clothing.    DIET  In the first 24 hours, it is common  to experience some nausea, so take clear liquids.  Once the nausea has resolved, a soft diet is recommended with gradual increase to your normal diet.    MEDICATIONS  You should take tylenol/acetaminophen and ibuprofen over the counter for a baseline pain control. You may have been prescribed narcotic pain meds. Pain medication should only be taken on an as needed basis for breakthrough pain not controlled with OTC meds.   Antibiotics, if ordered, should be taken as directed until the prescription has been completed. Typically patients should take an antibiotic twice per day for 3 days.  A stool softener (Miralax, Colace or docusate sodium) is recommended to maintain soft stools after surgery. Avoid straining while using the restroom.  If you do not have a bowel movement within 36-48 hours of surgery, take 2 tablespoons of Milk of Magnesia.  If there is still no bowel movement by the next day, take ½ bottle of Magnesium Citrate (refrigerate and drink with a straw).    WHEN TO CALL THE DOCTOR:  For heavy vaginal bleeding (vaginal discharge like a light period is expected for the first 1-2 weeks)  Redness or swelling around the incision  Fever over 101 F (38.5 C)  Significant vaginal drainage   Persistent pain unrelieved by the pain medication  Leg swelling  Shortness of breath  Burning with urination  Inability to urinate  Abdominal pain not improving with time      FOR EMERGENCIES  If any unusual problems, questions or concerns, please contact your physician or the resident on call at [phone] after hours or during office hours, [phone]

## 2023-10-05 ENCOUNTER — OFFICE VISIT (OUTPATIENT)
Dept: UROLOGY | Facility: CLINIC | Age: 73
End: 2023-10-05
Payer: MEDICARE

## 2023-10-05 ENCOUNTER — TELEPHONE (OUTPATIENT)
Dept: UROLOGY | Facility: CLINIC | Age: 73
End: 2023-10-05
Payer: MEDICARE

## 2023-10-05 VITALS
WEIGHT: 162.25 LBS | SYSTOLIC BLOOD PRESSURE: 154 MMHG | HEART RATE: 65 BPM | BODY MASS INDEX: 29.86 KG/M2 | HEIGHT: 62 IN | DIASTOLIC BLOOD PRESSURE: 73 MMHG

## 2023-10-05 DIAGNOSIS — Z98.890 POST-OPERATIVE STATE: Primary | ICD-10-CM

## 2023-10-05 PROCEDURE — 99024 POSTOP FOLLOW-UP VISIT: CPT | Mod: POP,,, | Performed by: UROLOGY

## 2023-10-05 PROCEDURE — 99999 PR PBB SHADOW E&M-EST. PATIENT-LVL V: ICD-10-PCS | Mod: PBBFAC,,, | Performed by: UROLOGY

## 2023-10-05 PROCEDURE — 99215 OFFICE O/P EST HI 40 MIN: CPT | Mod: PBBFAC | Performed by: UROLOGY

## 2023-10-05 PROCEDURE — 99024 PR POST-OP FOLLOW-UP VISIT: ICD-10-PCS | Mod: POP,,, | Performed by: UROLOGY

## 2023-10-05 PROCEDURE — 99999 PR PBB SHADOW E&M-EST. PATIENT-LVL V: CPT | Mod: PBBFAC,,, | Performed by: UROLOGY

## 2023-10-05 NOTE — PROGRESS NOTES
CHIEF COMPLAINT:    Mrs. Roger is a 72 y.o. female presenting for an urgent visit for post op bleeding.     PRESENTING ILLNESS:    Anne Roger is a 72 y.o. female who is status post anterior colporrhaphy and SSL fixation on 9/26/2023.  She called to the resident this morning stating that she had some light bleeding the first several days post op, however, yesterday, it was heavy, with BRB.  And she soaked several pads.  It was still heavy this morning.  She was instructed to come into clinic urgently and not to eat or drink anything.     She has pain on the right sit bone when she sits down.  She has been having normal bowel movements, has not had to strain.  She has been walking around the house, does laundry.        Past Surgical History:   Procedure Laterality Date    ANTERIOR VAGINAL REPAIR N/A 9/26/2023    Procedure: COLPORRHAPHY, ANTERIOR;  Surgeon: Kandy Reagan MD;  Location: [facility] OR [address] [place] [address];  Service: Urology;  Laterality: N/A;    CYSTOSCOPY N/A 9/26/2023    Procedure: CYSTOSCOPY;  Surgeon: Kandy Reagan MD;  Location: [facility] OR [address] [place] [address];  Service: Urology;  Laterality: N/A;    DILATION AND CURETTAGE OF UTERUS      HYSTERECTOMY      OOPHORECTOMY      REPAIR OF SACROSPINOUS LIGAMENT Right 9/26/2023    Procedure: REPAIR, LIGAMENT, SACROSPINOUS;  Surgeon: Kandy Reagan MD;  Location: [facility] OR [address] [place] [address];  Service: Urology;  Laterality: Right;    TONSILLECTOMY         Allergies:  Codeine, Plavix [clopidogrel], Ramipril, and Ibandronate    Medications:  Outpatient Encounter Medications as of 10/5/2023   Medication Sig Dispense Refill    alendronate (FOSAMAX) 70 MG tablet Take 1 tablet (70 mg total) by mouth every 7 days. 12 tablet 4    aspirin (ECOTRIN) 81 MG EC tablet Take 1 tablet (81 mg total) by mouth once daily.  0    atorvastatin (LIPITOR) 40 MG tablet Take 1 tablet (40 mg total) by mouth every evening. 90 tablet 3    calcium-vitamin D3 (OS-JAYLIN 500 + D3) 500 mg(1,250mg) -200 unit per  tablet Take 1 tablet by mouth once daily. OSCAL 500/200 D-3 500-200 MG-UNIT TABS      cholecalciferol, vitamin D3, (VITAMIN D3) 2,000 unit Tab Take 1 tablet (2,000 Units total) by mouth once daily.      CONTOUR NEXT TEST STRIPS Strp USE 1 STRIP TO CHECK GLUCOSE BEFORE BREAKFAST 100 each 0    diabetic supplies, miscellan. Misc MICROLET LANCETS MISC      FOLIC ACID/MULTIVIT-MIN/LUTEIN (CENTRUM SILVER ORAL) Take 1 tablet by mouth nightly.       glimepiride (AMARYL) 2 MG tablet TAKE 1 & 1/2 (ONE & ONE-HALF) TABLETS BY MOUTH TWICE DAILY 270 tablet 3    GLUC HCL/GLUC HUGHES/NN-GFJ-O-GLUC (GLUCOSAMINE COMPLEX ORAL) Take 1 tablet by mouth once daily at 6am.       ibuprofen (ADVIL,MOTRIN) 800 MG tablet Take 1 tablet (800 mg total) by mouth every 6 (six) hours as needed for Pain. 20 tablet 0    lancets (MICROLET LANCET) Misc USE 1  TO CHECK GLUCOSE TWICE DAILY 100 each 3    levothyroxine (SYNTHROID) 75 MCG tablet Take 75 mcg by mouth.      losartan (COZAAR) 100 MG tablet Take 1 tablet (100 mg total) by mouth once daily. 90 tablet 3    magnesium 30 mg Tab Take 1 capsule by mouth nightly. MAGNESIUM TABS      metFORMIN (GLUCOPHAGE-XR) 750 MG ER 24hr tablet Take 1 tablet (750 mg total) by mouth 2 (two) times daily. 180 tablet 3    mupirocin (BACTROBAN) 2 % ointment Apply topically 2 (two) times daily as needed. 30 g 0    oxyCODONE (ROXICODONE) 5 MG immediate release tablet Take 1 tablet (5 mg total) by mouth every 6 (six) hours as needed for Pain. 12 tablet 0    polyethylene glycol (GLYCOLAX) 17 gram/dose powder Mix 1 capful (17 g) with liquid and take by mouth once daily. 510 g 0    [] acetaminophen (TYLENOL) 650 MG TbSR Take 1 tablet (650 mg total) by mouth every 8 (eight) hours. for 20 doses 20 tablet 0     Facility-Administered Encounter Medications as of 10/5/2023   Medication Dose Route Frequency Provider Last Rate Last Admin    doxycycline tablet 100 mg  100 mg Oral Once Kandy Reagan MD        LIDOcaine HCl 2%  urojet   Urethral Once Kandy Reagan MD             PHYSICAL EXAMINATION:    The patient generally appears in good health, is appropriately interactive, and is in no apparent distress.    Skin: No lesions.    Mental: Cooperative with normal affect.    Neuro: Grossly intact.    HEENT: Normal. No evidence of lymphadenopathy.    Chest:  normal inspiratory effort.    Extremities: No clubbing, cyanosis, or edema    Normal external female genitalia  Urethral meatus is normal  Urethra and bladder are nontender to bimanual exam  On the anterior wall, the sutures are in tact.  There is no clot in the vagina, no active bleeding.  What is on the pad is light red.    Uterus and cervix are surgically absent      IMPRESSION:    Post op state    PLAN:    1.  I asked her to stay for 30 minutes in the waiting room and re examined her again and had the same findings.  2.  It could have been a hematoma that released, however, I would have expected the discharge to be dark or old blood.  It is unusual to have bleeding this far out from surgery.  3.  Asked that she let me know if it occurs again  4.  Will convert the visit to a virtual visit if she remains fine next week.  Keep the 6 week in person visit.

## 2023-10-05 NOTE — TELEPHONE ENCOUNTER
Returned page from  for Dr. Mercado who is currently scrubbed.      Pt calling to report bright red bleeding s/p anterior colporrhaphy on 9/26/23 with Dr. Reagan.  She reports saturating one pad in a 2-3 hour time period.  Also reports passing some small clots yesterday.     Discussed above with Dr. Reagan who recommends pt to report to 2nd floor of [facility] NPO now.     Spoke to Mrs. Roger and relayed Dr. Reagan's recommendations.  She reports she has only drank one glass of water today thus far.  Instructed her to not have anything from this point on by mouth.  She verbalizes understanding of recommendations and reports her  will be driving her to [place] location shortly.

## 2023-10-11 ENCOUNTER — OFFICE VISIT (OUTPATIENT)
Dept: UROLOGY | Facility: CLINIC | Age: 73
End: 2023-10-11
Payer: MEDICARE

## 2023-10-11 DIAGNOSIS — Z98.890 POST-OPERATIVE STATE: Primary | ICD-10-CM

## 2023-10-11 PROCEDURE — 99024 PR POST-OP FOLLOW-UP VISIT: ICD-10-PCS | Mod: 95,POP,, | Performed by: UROLOGY

## 2023-10-11 PROCEDURE — 99024 POSTOP FOLLOW-UP VISIT: CPT | Mod: 95,POP,, | Performed by: UROLOGY

## 2023-10-11 NOTE — PROGRESS NOTES
The patient location is: [place], [place]  The chief complaint leading to consultation is: post op follow up    Visit type: audio only    Time with patient:  10 minutes of total time spent on the encounter, which includes face to face time and non-face to face time preparing to see the patient (eg, review of tests), obtaining and/or reviewing separately obtained history, documenting clinical information in the electronic or other health record, independently interpreting results (not separately reported) and communicating results to the patient/family/caregiver, or care coordination (not separately reported).     Each patient to whom he or she provides medical services by telemedicine is:  (1) informed of the relationship between the physician and patient and the respective role of any other health care provider with respect to management of the patient; and (2) notified that he or she may decline to receive medical services by telemedicine and may withdraw from such care at any time.      CHIEF COMPLAINT:    Mrs. Roger is a 72 y.o. female presenting for a post op visit, following anterior colporrhaphy and SSL fixation on 9/26/2023.     PRESENTING ILLNESS:    Anne Roger is a 72 y.o. female who states she is better.  The bleeding is much better than it was when she was seen urgently last week.  She states that she had a little more than just spotting but today it was much lighter.  The buttocks pain is still there but she is sitting on a pillow and that helps.  Is is improving with time.  She is using a high fiber diet to help with her bowel movements. She drover her car yesterday she had some pain as she did not use her pillow in the car.      Past Surgical History:   Procedure Laterality Date    ANTERIOR VAGINAL REPAIR N/A 9/26/2023    Procedure: COLPORRHAPHY, ANTERIOR;  Surgeon: Kandy Reagan MD;  Location: [facility] OR [address] [place] [address];  Service: Urology;  Laterality: N/A;    CYSTOSCOPY N/A 9/26/2023     Procedure: CYSTOSCOPY;  Surgeon: Kandy Reagan MD;  Location: [facility] OR [address] [place] [address];  Service: Urology;  Laterality: N/A;    DILATION AND CURETTAGE OF UTERUS      HYSTERECTOMY      OOPHORECTOMY      REPAIR OF SACROSPINOUS LIGAMENT Right 9/26/2023    Procedure: REPAIR, LIGAMENT, SACROSPINOUS;  Surgeon: Kandy Reagan MD;  Location: [facility] OR [address] [place] [address];  Service: Urology;  Laterality: Right;    TONSILLECTOMY         Allergies:  Codeine, Plavix [clopidogrel], Ramipril, and Ibandronate    Medications:  Outpatient Encounter Medications as of 10/11/2023   Medication Sig Dispense Refill    alendronate (FOSAMAX) 70 MG tablet Take 1 tablet (70 mg total) by mouth every 7 days. 12 tablet 4    aspirin (ECOTRIN) 81 MG EC tablet Take 1 tablet (81 mg total) by mouth once daily.  0    atorvastatin (LIPITOR) 40 MG tablet Take 1 tablet (40 mg total) by mouth every evening. 90 tablet 3    calcium-vitamin D3 (OS-JAYLIN 500 + D3) 500 mg(1,250mg) -200 unit per tablet Take 1 tablet by mouth once daily. OSCAL 500/200 D-3 500-200 MG-UNIT TABS      cholecalciferol, vitamin D3, (VITAMIN D3) 2,000 unit Tab Take 1 tablet (2,000 Units total) by mouth once daily.      CONTOUR NEXT TEST STRIPS Strp USE 1 STRIP TO CHECK GLUCOSE BEFORE BREAKFAST 100 each 0    diabetic supplies, miscellan. Misc MICROLET LANCETS MISC      FOLIC ACID/MULTIVIT-MIN/LUTEIN (CENTRUM SILVER ORAL) Take 1 tablet by mouth nightly.       glimepiride (AMARYL) 2 MG tablet TAKE 1 & 1/2 (ONE & ONE-HALF) TABLETS BY MOUTH TWICE DAILY 270 tablet 3    GLUC HCL/GLUC HUGHES/NS-MCV-D-GLUC (GLUCOSAMINE COMPLEX ORAL) Take 1 tablet by mouth once daily at 6am.       ibuprofen (ADVIL,MOTRIN) 800 MG tablet Take 1 tablet (800 mg total) by mouth every 6 (six) hours as needed for Pain. 20 tablet 0    lancets (MICROLET LANCET) Misc USE 1  TO CHECK GLUCOSE TWICE DAILY 100 each 3    levothyroxine (SYNTHROID) 75 MCG tablet Take 75 mcg by mouth.      losartan (COZAAR) 100 MG tablet Take 1 tablet (100 mg total)  by mouth once daily. 90 tablet 3    magnesium 30 mg Tab Take 1 capsule by mouth nightly. MAGNESIUM TABS      metFORMIN (GLUCOPHAGE-XR) 750 MG ER 24hr tablet Take 1 tablet (750 mg total) by mouth 2 (two) times daily. 180 tablet 3    mupirocin (BACTROBAN) 2 % ointment Apply topically 2 (two) times daily as needed. 30 g 0    oxyCODONE (ROXICODONE) 5 MG immediate release tablet Take 1 tablet (5 mg total) by mouth every 6 (six) hours as needed for Pain. 12 tablet 0    polyethylene glycol (GLYCOLAX) 17 gram/dose powder Mix 1 capful (17 g) with liquid and take by mouth once daily. 510 g 0     Facility-Administered Encounter Medications as of 10/11/2023   Medication Dose Route Frequency Provider Last Rate Last Admin    doxycycline tablet 100 mg  100 mg Oral Once Kandy Reagan MD        LIDOcaine HCl 2% urojet   Urethral Once Kandy Reagan MD             PHYSICAL EXAMINATION:    The patient generally appears in good health, is appropriately interactive, and is in no apparent distress.    Mental: Cooperative with normal affect.    Chest:  normal inspiratory effort.    IMPRESSION:    Post op state    PLAN:    1.  Reiterated the post op limitations  2.  May see sutures closer to the 6 week follow up  3.  Follow up for the 6 week post op

## 2023-11-08 ENCOUNTER — OFFICE VISIT (OUTPATIENT)
Dept: UROLOGY | Facility: CLINIC | Age: 73
End: 2023-11-08
Payer: MEDICARE

## 2023-11-08 VITALS
HEIGHT: 62 IN | SYSTOLIC BLOOD PRESSURE: 124 MMHG | BODY MASS INDEX: 29.62 KG/M2 | HEART RATE: 80 BPM | DIASTOLIC BLOOD PRESSURE: 75 MMHG | WEIGHT: 160.94 LBS

## 2023-11-08 DIAGNOSIS — Z98.890 POST-OPERATIVE STATE: Primary | ICD-10-CM

## 2023-11-08 PROCEDURE — 99999 PR PBB SHADOW E&M-EST. PATIENT-LVL IV: CPT | Mod: PBBFAC,,, | Performed by: UROLOGY

## 2023-11-08 PROCEDURE — 99214 OFFICE O/P EST MOD 30 MIN: CPT | Mod: PBBFAC | Performed by: UROLOGY

## 2023-11-08 PROCEDURE — 99999 PR PBB SHADOW E&M-EST. PATIENT-LVL IV: ICD-10-PCS | Mod: PBBFAC,,, | Performed by: UROLOGY

## 2023-11-08 PROCEDURE — 99024 PR POST-OP FOLLOW-UP VISIT: ICD-10-PCS | Mod: POP,,, | Performed by: UROLOGY

## 2023-11-08 PROCEDURE — 99024 POSTOP FOLLOW-UP VISIT: CPT | Mod: POP,,, | Performed by: UROLOGY

## 2023-11-08 NOTE — PROGRESS NOTES
CHIEF COMPLAINT:    Mrs. Roger is a 72 y.o. female presenting for a post op visit, following following anterior colporrhaphy and SSL fixation on 9/26/2023.     PRESENTING ILLNESS:    Anne Roger is a 72 y.o. female who returns for follow up.  She states she had a vaginal discharge that resolved yesterday.  Was brownish in color. She has regulated herself well with dietary fiber so she has normal bowel movements.  After she was last seen, she did not have any significant vaginal bleeding.  The interval between voiding has increased and she is able to hold it comfortably.       Past Surgical History:   Procedure Laterality Date    ANTERIOR VAGINAL REPAIR N/A 9/26/2023    Procedure: COLPORRHAPHY, ANTERIOR;  Surgeon: Kandy Reagan MD;  Location: [facility] OR [address] [place] [address];  Service: Urology;  Laterality: N/A;    CYSTOSCOPY N/A 9/26/2023    Procedure: CYSTOSCOPY;  Surgeon: Kandy Reagan MD;  Location: [facility] OR [address] [place] [address];  Service: Urology;  Laterality: N/A;    DILATION AND CURETTAGE OF UTERUS      HYSTERECTOMY      OOPHORECTOMY      REPAIR OF SACROSPINOUS LIGAMENT Right 9/26/2023    Procedure: REPAIR, LIGAMENT, SACROSPINOUS;  Surgeon: Kandy Reagan MD;  Location: [facility] OR [address] [place] [address];  Service: Urology;  Laterality: Right;    TONSILLECTOMY         Allergies:  Codeine, Plavix [clopidogrel], Ramipril, and Ibandronate    Medications:  Outpatient Encounter Medications as of 11/8/2023   Medication Sig Dispense Refill    alendronate (FOSAMAX) 70 MG tablet Take 1 tablet (70 mg total) by mouth every 7 days. 12 tablet 4    aspirin (ECOTRIN) 81 MG EC tablet Take 1 tablet (81 mg total) by mouth once daily.  0    atorvastatin (LIPITOR) 40 MG tablet Take 1 tablet (40 mg total) by mouth every evening. 90 tablet 3    calcium-vitamin D3 (OS-JAYLIN 500 + D3) 500 mg(1,250mg) -200 unit per tablet Take 1 tablet by mouth once daily. OSCAL 500/200 D-3 500-200 MG-UNIT TABS      cholecalciferol, vitamin D3, (VITAMIN D3) 2,000 unit Tab Take  1 tablet (2,000 Units total) by mouth once daily.      CONTOUR NEXT TEST STRIPS Strp USE 1 STRIP TO CHECK GLUCOSE BEFORE BREAKFAST 100 each 0    diabetic supplies, miscellan. Misc MICROLET LANCETS MISC      FOLIC ACID/MULTIVIT-MIN/LUTEIN (CENTRUM SILVER ORAL) Take 1 tablet by mouth nightly.       glimepiride (AMARYL) 2 MG tablet TAKE 1 & 1/2 (ONE & ONE-HALF) TABLETS BY MOUTH TWICE DAILY 270 tablet 3    GLUC HCL/GLUC HUGHES/CP-NFV-N-GLUC (GLUCOSAMINE COMPLEX ORAL) Take 1 tablet by mouth once daily at 6am.       ibuprofen (ADVIL,MOTRIN) 800 MG tablet Take 1 tablet (800 mg total) by mouth every 6 (six) hours as needed for Pain. 20 tablet 0    lancets (MICROLET LANCET) Misc USE 1  TO CHECK GLUCOSE TWICE DAILY 100 each 3    levothyroxine (SYNTHROID) 75 MCG tablet Take 75 mcg by mouth.      losartan (COZAAR) 100 MG tablet Take 1 tablet (100 mg total) by mouth once daily. 90 tablet 3    magnesium 30 mg Tab Take 1 capsule by mouth nightly. MAGNESIUM TABS      metFORMIN (GLUCOPHAGE-XR) 750 MG ER 24hr tablet Take 1 tablet (750 mg total) by mouth 2 (two) times daily. 180 tablet 3    mupirocin (BACTROBAN) 2 % ointment Apply topically 2 (two) times daily as needed. 30 g 0    oxyCODONE (ROXICODONE) 5 MG immediate release tablet Take 1 tablet (5 mg total) by mouth every 6 (six) hours as needed for Pain. 12 tablet 0    polyethylene glycol (GLYCOLAX) 17 gram/dose powder Mix 1 capful (17 g) with liquid and take by mouth once daily. 510 g 0     Facility-Administered Encounter Medications as of 11/8/2023   Medication Dose Route Frequency Provider Last Rate Last Admin    doxycycline tablet 100 mg  100 mg Oral Once Kandy Reagan MD        LIDOcaine HCl 2% urojet   Urethral Once Kandy Reagan MD             PHYSICAL EXAMINATION:    The patient generally appears in good health, is appropriately interactive, and is in no apparent distress.    Skin: No lesions.    Mental: Cooperative with normal affect.    Neuro: Grossly  intact.    HEENT: Normal. No evidence of lymphadenopathy.    Chest:  normal inspiratory effort.    Abdomen:  Soft, non-tender. No masses or organomegaly. Bladder is not palpable. No evidence of flank discomfort. No evidence of inguinal hernia.    Extremities: No clubbing, cyanosis, or edema    Normal external female genitalia  Urethral meatus is normal  Urethra and bladder are nontender to bimanual exam  Well supported anteriorly and posteriorly   Uterus and cervix are surgically absent and well supported.  Has several sutures in place  No adnexal masses    IMPRESSION:    Post op state    PLAN:    1.  Lifted the post op limitations  2.  Signed off that she may return to exercising at [facility]  3.  Follow up in 3 months

## 2023-12-13 ENCOUNTER — TELEPHONE (OUTPATIENT)
Dept: UROLOGY | Facility: CLINIC | Age: 73
End: 2023-12-13
Payer: MEDICARE

## 2023-12-13 NOTE — TELEPHONE ENCOUNTER
----- Message from Jonatan GURJIT Route sent at 12/13/2023 11:34 AM CST -----  Regarding: UTI  Contact: pt.  [phone]  Pt is calling in ref to a  UTI. She says she was seen at the urgent care in [facility] and is on an antibiotic. She does not feel it is working she is still experiencing some discomfort. Pt says her urine was yellow and very cloudy. Pt has an appt scheduled 12/15 @ 2 in [place]  pt wanted provider to know. Patient Requesting Call Back @  [phone]

## 2023-12-19 ENCOUNTER — OFFICE VISIT (OUTPATIENT)
Dept: UROLOGY | Facility: CLINIC | Age: 73
End: 2023-12-19
Payer: MEDICARE

## 2023-12-19 VITALS — BODY MASS INDEX: 29.62 KG/M2 | HEIGHT: 62 IN | WEIGHT: 160.94 LBS

## 2023-12-19 DIAGNOSIS — R39.9 UTI SYMPTOMS: ICD-10-CM

## 2023-12-19 DIAGNOSIS — N39.0 RECURRENT UTI: Primary | ICD-10-CM

## 2023-12-19 LAB
BILIRUBIN, UA POC OHS: NEGATIVE
BLOOD, UA POC OHS: NEGATIVE
CLARITY, UA POC OHS: CLEAR
COLOR, UA POC OHS: YELLOW
GLUCOSE, UA POC OHS: NEGATIVE
KETONES, UA POC OHS: NEGATIVE
LEUKOCYTES, UA POC OHS: NEGATIVE
NITRITE, UA POC OHS: NEGATIVE
PH, UA POC OHS: 8.5
PROTEIN, UA POC OHS: NEGATIVE
SPECIFIC GRAVITY, UA POC OHS: 1.01
UROBILINOGEN, UA POC OHS: 0.2

## 2023-12-19 PROCEDURE — 99999PBSHW POCT URINALYSIS(INSTRUMENT): ICD-10-PCS | Mod: PBBFAC,,,

## 2023-12-19 PROCEDURE — 99999 PR PBB SHADOW E&M-EST. PATIENT-LVL III: ICD-10-PCS | Mod: PBBFAC,,,

## 2023-12-19 PROCEDURE — 99214 OFFICE O/P EST MOD 30 MIN: CPT | Mod: S$PBB,24,,

## 2023-12-19 PROCEDURE — 99213 OFFICE O/P EST LOW 20 MIN: CPT | Mod: PBBFAC,PO

## 2023-12-19 PROCEDURE — 81003 URINALYSIS AUTO W/O SCOPE: CPT | Mod: PBBFAC,PO

## 2023-12-19 PROCEDURE — 99214 PR OFFICE/OUTPT VISIT, EST, LEVL IV, 30-39 MIN: ICD-10-PCS | Mod: S$PBB,24,,

## 2023-12-19 PROCEDURE — 99999 PR PBB SHADOW E&M-EST. PATIENT-LVL III: CPT | Mod: PBBFAC,,,

## 2023-12-19 PROCEDURE — 99999PBSHW POCT URINALYSIS(INSTRUMENT): Mod: PBBFAC,,,

## 2023-12-19 PROCEDURE — 87086 URINE CULTURE/COLONY COUNT: CPT

## 2023-12-19 RX ORDER — TRIAMCINOLONE ACETONIDE 1 MG/G
CREAM TOPICAL 2 TIMES DAILY PRN
COMMUNITY
Start: 2023-08-02

## 2023-12-19 NOTE — PROGRESS NOTES
[facility] Note  Staff: Ashley Clark FNP-C    PCP: MD Mirela    Chief Complaint: UTI Symptoms    Subjective:        HPI: Anne Roger is a 73 y.o. female new patient to me presents today for cath urine per Dr. Reagan. She is established with Dr. Reagan. She was recently seen at  and given a course of mocrobid for suspected UTI, however a urine culture was not ordered. She was told by Dr. Reagan's office to stop the abx. She states she last took an abx on 12/14/2023. She denies any symptoms today. She recently underwent anterior colporrhaphy with Dr. Reagan on 9/26/2023.     Questions asked pt during office visit today:  Urgency:No, incontinence with urgency? No;   DysuriaNo  Gross HematuriaNo  History of UTI: Yes    History of Kidney Stones?:  No    Constipation issues?:  No    REVIEW OF SYSTEMS:  Review of Systems   Constitutional: Negative.  Negative for chills and fever.   HENT: Negative.     Eyes: Negative.    Respiratory: Negative.     Cardiovascular: Negative.    Gastrointestinal: Negative.  Negative for abdominal pain, constipation, diarrhea, nausea and vomiting.   Genitourinary: Negative.  Negative for dysuria, flank pain, frequency, hematuria and urgency.   Musculoskeletal: Negative.  Negative for back pain.   Skin: Negative.    Neurological: Negative.    Endo/Heme/Allergies: Negative.    Psychiatric/Behavioral: Negative.         PMHx:  Past Medical History:   Diagnosis Date    a Coronary Artery Disease     Dr. Pepe Lisa; 5/31/17 C/Angiogram = (See Report); On Medical Management Only    a Patent Foramen Ovale With Atrial Septal Aneurym ####    Dr. Pepe Lisa; Dr. Jackelin Montes; In 2016 Her Insurance Denied Coverage For Her Recommended PFO Closure Procedure    b Hypertension     1/18/19 Increased Losartan 12.5 Mg To 25 Mg Daily    c Hypercholesterolemia     d Type 2 Diabetes Mellitus     12/13/19 Referred To Dr. Radha Dietz; 6/10/19 Increased Metformin-XR To 750 Mg  "BID; On Metformin- Mg BID And Amaryl 2 Mg BID    e Hypothyroidism     f Osteopenia (Improved From Previous Osteoporosis)     On Fosamax 70 Mg qWeek, And OTC D3 2K IU Daily, And OTC CaCO3 500 Mg BID    i SANAM With Hypersomnia     Dr. Chari Caal; Patient Prefers A Dental Appliance    j H/O Adenomatous Colon Polyp On Previous TC ###    Dr. Donn Lopez (After Her 8/19/19 TC Was Free Of Polyps): "Repeat TC In 5 Yrs"    k Nocturia     09/2014 Referred To Dr. Kyler Eller     l Bilateral Plantar Fasciitis     Dr. Farrar "Dax" Jayne Last Saw Her On 1/4/17    l Hallux Limitus Right Foot > Left Foot     Dr. Charan Godoy (Jay) Last Saw Her On 1/4/17    l Left Shoulder Arthropathy     l Left Thumb Base Osteoarthritis     8/18/22 Referred To Dr. Jhonathan Nance; 8/18/22 RXd Voltaren Topical    l Right Shoulder Pain With Inflammation On 11/14 MRI     Dr. Jess Fox; Received PT For This 11/2014    m Bilateral Leg Paresthesias     Dr. Jackelin Montes; 1/22/19 Brain MRI W/O Contrast = (See Report); 2/25/19 Brain And Neck MRA = Unremarkable (See Reports)    m Chronic Fatigue     10/17/16 CBC, TFTs (On Synthroid), Vitamin B12 Level, And Vitamin D3 Level = Normal;  imporving    m H/O CVA From PFO 04/2014 With No Sequelae ###    Dr. Jackelin Montes; Dr. Naranjo (Neurology); In 2016 Her Insurance Denied Coverage For Her Recommended PFO Closure Procedure    m Migraines     Dr. Jackelin Montes; Infrequent    n Chronic Depression     o Recurrent Dizziness Episodes ###    Dr. Jackelin Montes; 1/22/19 Brain MRI W/O Contrast = (See Report); 2/25/19 Brain And Neck MRA = Unremarkable (See Reports)    o Recurrent sinusitis     1/18/19 Referred To Dr. Perez Somers    p Early Bilateral Cataracts     Dr. 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 MORAIMA Marroquin    q BLE Varicose Veins     q Borderline Vitamin D Deficiency     12/13/19 RXd OTC D3 2K IU Daily    Wellness Visit 8/21/2023        PSHx:  Past Surgical History:   Procedure Laterality Date "    ANTERIOR VAGINAL REPAIR N/A 9/26/2023    Procedure: COLPORRHAPHY, ANTERIOR;  Surgeon: Kandy Reagan MD;  [facility];  Service: Urology;  Laterality: N/A;    CYSTOSCOPY N/A 9/26/2023    Procedure: CYSTOSCOPY;  Surgeon: Kandy Reagan MD;  [facility];  Service: Urology;  Laterality: N/A;    DILATION AND CURETTAGE OF UTERUS      HYSTERECTOMY      OOPHORECTOMY      REPAIR OF SACROSPINOUS LIGAMENT Right 9/26/2023    Procedure: REPAIR, LIGAMENT, SACROSPINOUS;  Surgeon: Kandy Reagan MD;  [facility];  Service: Urology;  Laterality: Right;    TONSILLECTOMY         Fam Hx:   malignancies: No , gyn malignancies: No   kidney stones: No     Soc Hx:  , lives in [place]    Allergies:  Codeine, Plavix [clopidogrel], Ramipril, and Ibandronate    Medications: reviewed     Objective:   There were no vitals filed for this visit.    Physical Exam  Constitutional:       Appearance: Normal appearance.   HENT:      Head: Normocephalic.      Mouth/Throat:      Mouth: Mucous membranes are moist.   Eyes:      Conjunctiva/sclera: Conjunctivae normal.   Pulmonary:      Effort: Pulmonary effort is normal.   Abdominal:      General: There is no distension.      Palpations: Abdomen is soft.      Tenderness: There is no abdominal tenderness.   Musculoskeletal:         General: Normal range of motion.      Cervical back: Normal range of motion.   Skin:     General: Skin is warm.   Neurological:      Mental Status: She is alert and oriented to person, place, and time.   Psychiatric:         Mood and Affect: Mood normal.         Behavior: Behavior normal.           LABS REVIEW:  UA today:  cath urine  Color:Clear, Yellow  Spec. Grav.  1.015  PH  8.5  Negative for leukocytes, nitrates, protein, glucose, ketones, urobili, bili, and blood.    Assessment:       1. Recurrent UTI    2. UTI symptoms          Plan:      Cath urine sent for culture  F/u with Dr. Reagan    F/u As  Needed    [facility]: Active    SAMARIA CheemaC

## 2023-12-20 LAB — BACTERIA UR CULT: NO GROWTH

## 2024-02-07 ENCOUNTER — OFFICE VISIT (OUTPATIENT)
Dept: UROLOGY | Facility: CLINIC | Age: 74
End: 2024-02-07
Payer: MEDICARE

## 2024-02-07 VITALS
BODY MASS INDEX: 30.02 KG/M2 | HEIGHT: 62 IN | DIASTOLIC BLOOD PRESSURE: 71 MMHG | WEIGHT: 163.13 LBS | HEART RATE: 80 BPM | SYSTOLIC BLOOD PRESSURE: 111 MMHG

## 2024-02-07 DIAGNOSIS — Z09 STATUS POST UROLOGICAL SURGERY, FOLLOW-UP EXAM: Primary | ICD-10-CM

## 2024-02-07 PROCEDURE — 99999 PR PBB SHADOW E&M-EST. PATIENT-LVL III: CPT | Mod: PBBFAC,,, | Performed by: UROLOGY

## 2024-02-07 PROCEDURE — 99214 OFFICE O/P EST MOD 30 MIN: CPT | Mod: S$PBB,,, | Performed by: UROLOGY

## 2024-02-07 PROCEDURE — 99213 OFFICE O/P EST LOW 20 MIN: CPT | Mod: PBBFAC | Performed by: UROLOGY

## 2024-02-07 NOTE — PROGRESS NOTES
CHIEF COMPLAINT:    Mrs. Roger is a 73 y.o. female presenting for a follow up after anterior colporrhaphy and cystoscopy on 9/26/2023.      PRESENTING ILLNESS:    Anne Roger is a 73 y.o. female who presents with a history of pelvic organ prolapse who underwent the above surgery. She returns stating she is doing well from the standpoint of the surgery.  No issues urinating or having bowel movements.      She is in mourning over the loss of her sister in law who was murdered at the end of last year in a random act of violence.  She and her family have also lost several other people in the last year which has been a challenge.      REVIEW OF SYSTEMS:    Review of Systems   Constitutional: Negative.    HENT: Negative.     Eyes: Negative.    Respiratory: Negative.     Cardiovascular: Negative.    Gastrointestinal: Negative.    Genitourinary: Negative.    Musculoskeletal: Negative.    Skin: Negative.    Neurological: Negative.    Endo/Heme/Allergies: Negative.    Psychiatric/Behavioral: Negative.         PATIENT HISTORY:    Past Medical History:   Diagnosis Date    a Coronary Artery Disease     Dr. Pepe Lisa; 5/31/17 [facility]/Angiogram = (See Report); On Medical Management Only    a Patent Foramen Ovale With Atrial Septal Aneurym ####    Dr. Pepe Lisa; Dr. Jackelin Montes; In 2016 Her Insurance Denied Coverage For Her Recommended PFO Closure Procedure    b Hypertension     1/18/19 Increased Losartan 12.5 Mg To 25 Mg Daily    c Hypercholesterolemia     d Type 2 Diabetes Mellitus     12/13/19 Referred To Dr. Radha Dietz; 6/10/19 Increased Metformin-XR To 750 Mg BID; On Metformin- Mg BID And Amaryl 2 Mg BID    e Hypothyroidism     f Osteopenia (Improved From Previous Osteoporosis)     On Fosamax 70 Mg qWeek, And OTC D3 2K IU Daily, And OTC CaCO3 500 Mg BID    i SANAM With Hypersomnia     Dr. Chari Caal; Patient Prefers A Dental Appliance    j H/O Adenomatous Colon Polyp On Previous TC ###    Dr. Pop  "Jessica (After Her 8/19/19 TC Was Free Of Polyps): "Repeat TC In 5 Yrs"    k Nocturia     09/2014 Referred To Dr. Kyler carrington Bilateral Plantar Fasciitis     Dr. Charan Godoy (Jay) Last Saw Her On 1/4/17    l Hallux Limitus Right Foot > Left Foot     Dr. Chaarn Godoy (Jay) Last Saw Her On 1/4/17    l Left Shoulder Arthropathy     l Left Thumb Base Osteoarthritis     8/18/22 Referred To Dr. Jhonathan Nance; 8/18/22 RXd Voltaren Topical    l Right Shoulder Pain With Inflammation On 11/14 MRI     Dr. Jess Fox; Received PT For This 11/2014    m Bilateral Leg Paresthesias     Dr. Jackelin Montes; 1/22/19 Brain MRI W/O Contrast = (See Report); 2/25/19 Brain And Neck MRA = Unremarkable (See Reports)    m Chronic Fatigue     10/17/16 CBC, TFTs (On Synthroid), Vitamin B12 Level, And Vitamin D3 Level = Normal;  imporving    m H/O CVA From PFO 04/2014 With No Sequelae ###    Dr. Jackelin Montes; Dr. Naranjo (Neurology); In 2016 Her Insurance Denied Coverage For Her Recommended PFO Closure Procedure    m Migraines     Dr. Jackelin Montes; Infrequent    n Chronic Depression     o Recurrent Dizziness Episodes ###    Dr. Jackelin Montes; 1/22/19 Brain MRI W/O Contrast = (See Report); 2/25/19 Brain And Neck MRA = Unremarkable (See Reports)    o Recurrent sinusitis     1/18/19 Referred To Dr. Perez Somers    p Early Bilateral Cataracts     Dr. 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 MORAIMA Marroquin    q BLE Varicose Veins     q Borderline Vitamin D Deficiency     12/13/19 RXd OTC D3 2K IU Daily    Wellness Visit 8/21/2023        Past Surgical History:   Procedure Laterality Date    ANTERIOR VAGINAL REPAIR N/A 9/26/2023    Procedure: COLPORRHAPHY, ANTERIOR;  Surgeon: Kandy Reagan MD;  Location: [facility] OR [address] [place] [address];  Service: Urology;  Laterality: N/A;    CYSTOSCOPY N/A 9/26/2023    Procedure: CYSTOSCOPY;  Surgeon: Kandy Reagan MD;  Location: [facility] OR 2ND FLR;  Service: Urology;  Laterality: N/A;    DILATION AND CURETTAGE OF " UTERUS      HYSTERECTOMY      OOPHORECTOMY      REPAIR OF SACROSPINOUS LIGAMENT Right 9/26/2023    Procedure: REPAIR, LIGAMENT, SACROSPINOUS;  Surgeon: Kandy Reagan MD;  Location: [facility] OR [address] [place] [address];  Service: Urology;  Laterality: Right;    TONSILLECTOMY         Family History   Problem Relation Age of Onset    Depression Other     Diabetes Other     Thyroid disease Other     Lung cancer Other     Diabetes Mother     Blindness Mother     Endocrine tumor Mother     Lung cancer Father     Cancer Father         lung and brain    Anxiety disorder Sister     Deafness Sister     Deafness Brother     Cancer Sister         bladder    Valvular heart disease Sister        Social History     Socioeconomic History    Marital status:      Spouse name: Chinmay    Number of children: 1   Occupational History    Occupation: Sales   Tobacco Use    Smoking status: Never     Passive exposure: Never    Smokeless tobacco: Never   Substance and Sexual Activity    Alcohol use: Yes     Alcohol/week: 1.0 standard drink of alcohol     Types: 1 Glasses of wine per week     Comment: occasionally    Drug use: No    Sexual activity: Not Currently     Partners: Male       Allergies:  Codeine, Plavix [clopidogrel], Ramipril, and Ibandronate    Medications:  Outpatient Encounter Medications as of 2/7/2024   Medication Sig Dispense Refill    alendronate (FOSAMAX) 70 MG tablet Take 1 tablet (70 mg total) by mouth every 7 days. 12 tablet 4    aspirin (ECOTRIN) 81 MG EC tablet Take 1 tablet (81 mg total) by mouth once daily.  0    atorvastatin (LIPITOR) 40 MG tablet Take 1 tablet (40 mg total) by mouth every evening. 90 tablet 3    calcium-vitamin D3 (OS-JAYLIN 500 + D3) 500 mg(1,250mg) -200 unit per tablet Take 1 tablet by mouth once daily. OSCAL 500/200 D-3 500-200 MG-UNIT TABS      cholecalciferol, vitamin D3, (VITAMIN D3) 2,000 unit Tab Take 1 tablet (2,000 Units total) by mouth once daily.      CONTOUR NEXT TEST STRIPS Strp USE 1 STRIP TO  CHECK GLUCOSE BEFORE BREAKFAST 100 each 0    diabetic supplies, miscellan. Misc MICROLET LANCETS MISC      FOLIC ACID/MULTIVIT-MIN/LUTEIN (CENTRUM SILVER ORAL) Take 1 tablet by mouth nightly.       glimepiride (AMARYL) 2 MG tablet TAKE 1 & 1/2 (ONE & ONE-HALF) TABLETS BY MOUTH TWICE DAILY 270 tablet 3    GLUC HCL/GLUC HUGHES/DE-IYH-R-GLUC (GLUCOSAMINE COMPLEX ORAL) Take 1 tablet by mouth once daily at 6am.       ibuprofen (ADVIL,MOTRIN) 800 MG tablet Take 1 tablet (800 mg total) by mouth every 6 (six) hours as needed for Pain. 20 tablet 0    lancets (MICROLET LANCET) Misc USE 1  TO CHECK GLUCOSE TWICE DAILY 100 each 3    levothyroxine (SYNTHROID) 75 MCG tablet Take 75 mcg by mouth.      losartan (COZAAR) 100 MG tablet Take 1 tablet (100 mg total) by mouth once daily. 90 tablet 3    magnesium 30 mg Tab Take 1 capsule by mouth nightly. MAGNESIUM TABS      metFORMIN (GLUCOPHAGE-XR) 750 MG ER 24hr tablet Take 1 tablet (750 mg total) by mouth 2 (two) times daily. 180 tablet 3    mupirocin (BACTROBAN) 2 % ointment Apply topically 2 (two) times daily as needed. 30 g 0    triamcinolone acetonide 0.1% (KENALOG) 0.1 % cream Apply topically 2 (two) times daily as needed.       No facility-administered encounter medications on file as of 2/7/2024.         PHYSICAL EXAMINATION:    The patient generally appears in good health, is appropriately interactive, and is in no apparent distress.    Skin: No lesions.    Mental: Cooperative with normal affect.    Neuro: Grossly intact.    HEENT: Normal. No evidence of lymphadenopathy.    Chest:  normal inspiratory effort.    Abdomen: Soft, non-tender. No masses or organomegaly. Bladder is not palpable. No evidence of flank discomfort. No evidence of inguinal hernia.    Extremities: No clubbing, cyanosis, or edema    Normal external female genitalia  Urethral meatus is normal  Urethra and bladder are nontender to bimanual exam  Well supported posteriorly   Anteriorly, there is a stage I  cystocele  Uterus and cervix are surgically absent, well supported  No adnexal masses    LABS:    Lab Results   Component Value Date    BUN 13 09/27/2023    CREATININE 0.8 09/27/2023       IMPRESSION:    Status post urologic surgery    PLAN:    1. Reassured patient  2.  Follow up in 6 months.

## 2024-02-26 PROBLEM — E11.65 TYPE 2 DIABETES MELLITUS WITH HYPERGLYCEMIA, WITHOUT LONG-TERM CURRENT USE OF INSULIN: Status: ACTIVE | Noted: 2024-02-26

## 2024-02-26 PROBLEM — Z86.0101 HISTORY OF ADENOMATOUS POLYP OF COLON: Status: ACTIVE | Noted: 2024-02-26

## 2024-02-26 PROBLEM — Z86.010 HISTORY OF ADENOMATOUS POLYP OF COLON: Status: ACTIVE | Noted: 2024-02-26

## 2024-02-26 PROBLEM — N39.0 FREQUENT UTI: Status: ACTIVE | Noted: 2024-02-26

## 2024-03-03 PROBLEM — I65.23 BILATERAL CAROTID ARTERY STENOSIS: Status: ACTIVE | Noted: 2024-03-03

## 2024-03-18 PROBLEM — R19.5 POSITIVE COLORECTAL CANCER SCREENING USING COLOGUARD TEST: Status: ACTIVE | Noted: 2024-03-18

## 2024-03-18 RX ORDER — ALENDRONATE SODIUM 70 MG/1
70 TABLET ORAL
Qty: 12 TABLET | Refills: 0 | Status: SHIPPED | OUTPATIENT
Start: 2024-03-18 | End: 2024-06-07 | Stop reason: SDUPTHER

## 2024-05-27 PROBLEM — N39.0 FREQUENT UTI: Status: RESOLVED | Noted: 2024-02-26 | Resolved: 2024-05-27

## 2024-06-07 ENCOUNTER — PATIENT MESSAGE (OUTPATIENT)
Dept: OBSTETRICS AND GYNECOLOGY | Facility: CLINIC | Age: 74
End: 2024-06-07
Payer: MEDICARE

## 2024-06-07 DIAGNOSIS — Z12.39 ENCOUNTER FOR SCREENING FOR MALIGNANT NEOPLASM OF BREAST, UNSPECIFIED SCREENING MODALITY: Primary | ICD-10-CM

## 2024-06-07 DIAGNOSIS — Z12.31 ENCOUNTER FOR SCREENING MAMMOGRAM FOR MALIGNANT NEOPLASM OF BREAST: ICD-10-CM

## 2024-06-07 RX ORDER — ALENDRONATE SODIUM 70 MG/1
70 TABLET ORAL
Qty: 12 TABLET | Refills: 0 | Status: CANCELLED | OUTPATIENT
Start: 2024-06-07

## 2024-06-07 RX ORDER — ALENDRONATE SODIUM 70 MG/1
70 TABLET ORAL
Qty: 12 TABLET | Refills: 0 | Status: SHIPPED | OUTPATIENT
Start: 2024-06-07

## 2024-07-17 ENCOUNTER — TELEPHONE (OUTPATIENT)
Dept: NEUROLOGY | Facility: CLINIC | Age: 74
End: 2024-07-17
Payer: MEDICARE

## 2024-07-17 NOTE — TELEPHONE ENCOUNTER
Spoke with patient and scheduled appointment with Avis Souza for 9/12/24 and placed on wait list. Patient will check around for a sooner appointment and cancel if she finds one.

## 2024-07-17 NOTE — TELEPHONE ENCOUNTER
----- Message from Perla Benjamin sent at 7/17/2024  4:16 PM CDT -----  Contact: Self  Type:  Sooner Appointment Request    Caller is requesting a sooner appointment.  Caller declined first available appointment listed below.  Caller will not accept being placed on the waitlist and is requesting a message be sent to doctor.    Name of Caller:  Patient  When is the first available appointment?  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 Call  Best Call Back Number:  [phone]  Additional Information:  Pt stated she saw Dr Montes a few yrs ago and her doctor wants her to get back in, can we please call pt back to assist. Thank You

## 2024-08-15 ENCOUNTER — PATIENT MESSAGE (OUTPATIENT)
Dept: UROLOGY | Facility: CLINIC | Age: 74
End: 2024-08-15
Payer: MEDICARE

## 2024-12-09 ENCOUNTER — TELEPHONE (OUTPATIENT)
Dept: UROLOGY | Facility: CLINIC | Age: 74
End: 2024-12-09
Payer: MEDICARE

## 2024-12-09 NOTE — TELEPHONE ENCOUNTER
----- Message from Darcy sent at 12/9/2024 10:41 AM CST -----  Regarding: PT ADVICE  Contact: [phone]  Pt is requesting a call from someone in the office and is asking for a return call back soon. Thanks.     Reason for call:discuss plan of care for UTI STATES WAS IN THE ED THIS PASS WEEKEND      Patient's DX:     Patient requesting call back or [facility] msg: [phone]

## 2024-12-09 NOTE — TELEPHONE ENCOUNTER
----- Message from Nurse Tracy sent at 12/6/2024  5:03 PM CST -----  Regarding: FW: Consult/Advisory  Contact: [phone]    ----- Message -----  From: Shilpi Doherty  Sent: 12/6/2024   4:46 PM CST  To: Tez Gaitan Staff  Subject: Consult/Advisory                                 Consult/Advisory     Name Of Caller: Pt         Contact Preference:   [phone]     Nature of call: Pt states her UTI is back and would like to know if she can be sooner than Feb. Nothing available in epic sooner. She stated she would also like referral for a Uro in [facility]. Please call to advise

## 2024-12-17 ENCOUNTER — OFFICE VISIT (OUTPATIENT)
Dept: UROLOGY | Facility: CLINIC | Age: 74
End: 2024-12-17
Payer: MEDICARE

## 2024-12-17 ENCOUNTER — PATIENT MESSAGE (OUTPATIENT)
Dept: UROLOGY | Facility: CLINIC | Age: 74
End: 2024-12-17

## 2024-12-17 VITALS
SYSTOLIC BLOOD PRESSURE: 147 MMHG | BODY MASS INDEX: 29.4 KG/M2 | DIASTOLIC BLOOD PRESSURE: 85 MMHG | WEIGHT: 160.69 LBS | HEART RATE: 66 BPM

## 2024-12-17 DIAGNOSIS — N95.8 GENITOURINARY SYNDROME OF MENOPAUSE: Primary | ICD-10-CM

## 2024-12-17 DIAGNOSIS — R39.9 UTI SYMPTOMS: ICD-10-CM

## 2024-12-17 PROCEDURE — 51701 INSERT BLADDER CATHETER: CPT | Mod: PBBFAC | Performed by: UROLOGY

## 2024-12-17 PROCEDURE — 99214 OFFICE O/P EST MOD 30 MIN: CPT | Mod: PBBFAC | Performed by: UROLOGY

## 2024-12-17 PROCEDURE — 81000 URINALYSIS NONAUTO W/SCOPE: CPT | Mod: PBBFAC | Performed by: UROLOGY

## 2024-12-17 PROCEDURE — 99999PBSHW PR PBB SHADOW TECHNICAL ONLY FILED TO HB: Mod: PBBFAC,,,

## 2024-12-17 PROCEDURE — 99999 PR PBB SHADOW E&M-EST. PATIENT-LVL IV: CPT | Mod: PBBFAC,,, | Performed by: UROLOGY

## 2024-12-17 RX ORDER — ESTRADIOL 0.1 MG/G
1 CREAM VAGINAL
Qty: 42.5 G | Refills: 3 | Status: SHIPPED | OUTPATIENT
Start: 2024-12-18 | End: 2025-12-18

## 2024-12-17 NOTE — PROGRESS NOTES
PRESENTING ILLNESS:    History of Present Illness    CHIEF COMPLAINT:  Ms. Roger presents today for follow-up after recent surgeries and urinary symptoms.    URINARY SYMPTOMS:  She reports burning sensation with urination and incomplete bladder emptying, noting that additional urine comes out after waiting a few minutes. She has had two bladder infections within two months. During recent ER visit, urine samples showed white blood cells and mucus. She reports symptoms of bladder prolapse, which she can feel and see when wiping.    CURRENT MEDICATIONS:  She is currently taking Macrobid for UTI treatment and methenamine long-term to increase urine acidity.    GASTROINTESTINAL:  She has gastroparesis and takes Miralax intermittently to regulate bowel movements. Bowel movement consistency varies from diarrhea to rabbit pellets depending on Miralax usage. She avoids spicy foods, fried foods, Mexican cuisine, and Italian cuisine due to symptoms. Her diet includes oatmeal with nuts and berries most mornings and yogurt with fruit every third day.    SURGICAL HISTORY:  Status post-enterical porphyry and cystoscopy on 2023.    IMAGING:  CT and MRI of the brain in March showed no significant findings.    FAMILY HISTORY:  Maternal great aunt  from breast cancer.      ROS:  General: -fever, -chills, -fatigue, -weight gain, -weight loss  Eyes: -vision changes, -redness, -discharge  ENT: -ear pain, -nasal congestion, -sore throat  Cardiovascular: -chest pain, -palpitations, -lower extremity edema  Respiratory: -cough, -shortness of breath  Gastrointestinal: -abdominal pain, -nausea, -vomiting, +diarrhea, -constipation, -blood in stool  Genitourinary: -dysuria, -hematuria, -frequency, +painful urination  Musculoskeletal: -joint pain, -muscle pain  Skin: -rash, -lesion  Neurological: -headache, -dizziness, -numbness, -tingling  Psychiatric: -anxiety, -depression, -sleep difficulty         PATIENT  "HISTORY:    Past Medical History:   Diagnosis Date    a Coronary Artery Disease     Dr. Pepe Lisa; 5/31/17 LHC/Angiogram = (See Report); On Medical Management Only    a Mild-To-Moderate Bilateral Carotid Artery Stenosis     Will Repeat A Carotid U/S In 2-3 Years; 2/29/24 Bilateral Carotid AA U/S = Mild To Moderate BICA Disease (See Report)    a Patent Foramen Ovale With Atrial Septal Aneurym ####    Dr. Pepe Lisa; Dr. Jackelin Montes; In 2016 Her Insurance Denied Coverage For Her Recommended PFO Closure Procedure    b Hypertension     1/18/19 Increased Losartan 12.5 Mg To 25 Mg Daily    c Hypercholesterolemia     d Type 2 Diabetes Mellitus     12/13/19 Referred To Dr. Radha Dietz; 6/10/19 Increased Metformin-XR To 750 Mg BID; On Metformin- Mg BID And Amaryl 2 Mg BID    e Hypothyroidism     f Osteopenia (Improved From Previous Osteoporosis)     On Fosamax 70 Mg qWeek, And OTC D3 2K IU Daily, And OTC CaCO3 500 Mg BID    i SANAM With Hypersomnia     Dr. Chari Caal; Patient Prefers A Dental Appliance    j H/O Adenomatous Colon Polyp On Previous Colonoscopy ###    Dr. Donn Lopez (After Her 8/19/19 TC Was Free Of Polyps): "Repeat TC In 5 Yrs"    j Positive Cologuard Test 03/18/2024    3/18/24 Referred To Dr. Donn Lopez    k Frequent UTIs     k Nocturia     09/2014 Referred To Dr. Kyler carrington Bilateral Plantar Fasciitis     Dr. Charan Baer" [place] Last Saw Her On 1/4/17    l Hallux Limitus Right Foot > Left Foot     Dr. Charan Baer" Jayne Last Saw Her On 1/4/17    l Left Shoulder Arthropathy     l Left Thumb Base Osteoarthritis     8/18/22 Referred To Dr. Jhonathan Nance; 8/18/22 RXd Voltaren Topical    l Right Shoulder Pain With Inflammation On 11/14 MRI     Dr. Jess Fox; Received PT For This 11/2014    m Bilateral Leg Paresthesias     Dr. Jackelin Montes; 1/22/19 Brain MRI W/O Contrast = (See Report); 2/25/19 Brain And Neck MRA = Unremarkable (See Reports)    m Chronic Fatigue     10/17/16 CBC, TFTs (On " Synthroid), Vitamin B12 Level, And Vitamin D3 Level = Normal;  imporving    m H/O CVA From PFO 04/2014 With No Sequelae ###    Dr. Jackelin Montes; Dr. Naranjo (Neurology); In 2016 Her Insurance Denied Coverage For Her Recommended PFO Closure Procedure    m Migraines     Dr. Jackelin Montes; Infrequent    n Chronic Depression     o Recurrent Dizziness Episodes ###    Dr. Jackelin Montes; 1/22/19 Brain MRI W/O Contrast = (See Report); 2/25/19 Brain And Neck MRA = Unremarkable (See Reports)    o Recurrent sinusitis     1/18/19 Referred To Dr. Perez Somers    p Early Bilateral Cataracts     Dr. 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 MORAIMA Marroquin    q BLE Varicose Veins     q Borderline Vitamin D Deficiency     12/13/19 RXd OTC D3 2K IU Daily    Wellness Visit 10/28/2024        Past Surgical History:   Procedure Laterality Date    ANTERIOR VAGINAL REPAIR N/A 09/26/2023    Procedure: COLPORRHAPHY, ANTERIOR;  Surgeon: Kandy Reagan MD;  Location: [facility] OR [address] [place] [address];  Service: Urology;  Laterality: N/A;    COLONOSCOPY  10/07/2024    [facility]    CYSTOSCOPY N/A 09/26/2023    Procedure: CYSTOSCOPY;  Surgeon: Kandy Reagan MD;  Location: [facility] OR [address] [place] [address];  Service: Urology;  Laterality: N/A;    DILATION AND CURETTAGE OF UTERUS      HYSTERECTOMY      OOPHORECTOMY      REPAIR OF SACROSPINOUS LIGAMENT Right 09/26/2023    Procedure: REPAIR, LIGAMENT, SACROSPINOUS;  Surgeon: Kandy Reagan MD;  Location: [facility] OR [address] [place] [address];  Service: Urology;  Laterality: Right;    TONSILLECTOMY         Family History   Problem Relation Name Age of Onset    Diabetes Mother      Blindness Mother      Endocrine tumor Mother      Lung cancer Father      Brain cancer Father      Anxiety disorder Sister      Deafness Sister      Valvular heart disease Sister      Bladder Cancer Sister      Deafness Brother      Depression Other      Diabetes Other      Thyroid disease Other      Lung cancer Other         Social History     Socioeconomic  History    Marital status:      Spouse name: Chinmay    Number of children: 1   Occupational History    Occupation: Sales   Tobacco Use    Smoking status: Never     Passive exposure: Never    Smokeless tobacco: Never   Substance and Sexual Activity    Alcohol use: Yes     Alcohol/week: 1.0 standard drink of alcohol     Types: 1 Glasses of wine per week     Comment: occasionally    Drug use: No    Sexual activity: Not Currently     Partners: Male     Social Drivers of Health     Financial Resource Strain: Low Risk  (10/28/2024)    Overall Financial Resource Strain (CARDIA)     Difficulty of Paying Living Expenses: Not hard at all   Food Insecurity: No Food Insecurity (10/28/2024)    Hunger Vital Sign     Worried About Running Out of Food in the Last Year: Never true     Ran Out of Food in the Last Year: Never true   Transportation Needs: No Transportation Needs (10/28/2024)    PRAPARE - Transportation     Lack of Transportation (Medical): No     Lack of Transportation (Non-Medical): No   Physical Activity: Sufficiently Active (10/28/2024)    Exercise Vital Sign     Days of Exercise per Week: 4 days     Minutes of Exercise per Session: 60 min   Stress: No Stress Concern Present (10/28/2024)    [employer] - Occupational Stress Questionnaire     Feeling of Stress : Only a little   Housing Stability: Low Risk  (10/28/2024)    Housing Stability Vital Sign     Unable to Pay for Housing in the Last Year: No     Homeless in the Last Year: No       Allergies:  Codeine; Gloves, latex with aloe vera; Plavix [clopidogrel]; Ramipril; and Boniva [ibandronate]    Medications:  Outpatient Encounter Medications as of 12/17/2024   Medication Sig Dispense Refill    aspirin (ECOTRIN) 325 MG EC tablet Take 1 tablet (325 mg total) by mouth once daily.      atorvastatin (LIPITOR) 40 MG tablet Take 1 tablet (40 mg total) by mouth every evening. 90 tablet 3    blood sugar diagnostic (CONTOUR NEXT TEST  STRIPS) Strp USE 1 STRIP TO CHECK GLUCOSE BEFORE BREAKFAST 100 each 3    calcium-vitamin D3 (OS-JAYLIN 500 + D3) 500 mg(1,250mg) -200 unit per tablet Take 1 tablet by mouth once daily. OSCAL 500/200 D-3 500-200 MG-UNIT TABS      cholecalciferol, vitamin D3, (VITAMIN D3) 2,000 unit Tab Take 1 tablet (2,000 Units total) by mouth once daily.      diabetic supplies, miscellan. Misc MICROLET LANCETS MISC      [START ON 2024] estradioL (ESTRACE) 0.01 % (0.1 mg/gram) vaginal cream Place 1 g vaginally 3 (three) times a week. 42.5 g 3    FOLIC ACID/MULTIVIT-MIN/LUTEIN (CENTRUM SILVER ORAL) Take 1 tablet by mouth nightly.       glimepiride (AMARYL) 2 MG tablet Take 1 tablet (2 mg total) by mouth 2 (two) times daily with meals. 180 tablet 0    GLUC HCL/GLUC HUGHES/YA-DFK-N-GLUC (GLUCOSAMINE COMPLEX ORAL) Take 1 tablet by mouth once daily at 6am.       lancets (MICROLET LANCET) Misc USE 1  TO CHECK GLUCOSE TWICE DAILY 100 each 3    levothyroxine (SYNTHROID) 75 MCG tablet Take 1 tablet (75 mcg total) by mouth before breakfast. 90 tablet 3    losartan (COZAAR) 100 MG tablet Take 1 tablet (100 mg total) by mouth once daily. 90 tablet 3    magnesium 30 mg Tab Take 1 capsule by mouth nightly. MAGNESIUM TABS      meclizine (ANTIVERT) 25 mg tablet Take 1 tablet (25 mg total) by mouth 3 (three) times daily as needed for Dizziness. 30 tablet 0    metFORMIN (GLUCOPHAGE-XR) 750 MG ER 24hr tablet Take 1 tablet (750 mg total) by mouth 2 (two) times daily. 180 tablet 3    methenamine (HIPREX) 1 gram Tab Take 1/2 tablet twice a day 90 tablet 3    mupirocin (BACTROBAN) 2 % ointment Apply topically 2 (two) times daily as needed. 30 g 0    [] nitrofurantoin, macrocrystal-monohydrate, (MACROBID) 100 MG capsule Take 1 capsule (100 mg total) by mouth 2 (two) times daily. for 5 days 10 capsule 0    pantoprazole (PROTONIX) 40 MG tablet Take 40 mg by mouth once daily. The patient takes 20 mg      phenazopyridine (PYRIDIUM) 200 MG tablet Take 200  mg by mouth 3 (three) times daily as needed for Pain.      triamcinolone acetonide 0.1% (KENALOG) 0.1 % cream Apply topically 2 (two) times daily as needed.       No facility-administered encounter medications on file as of 12/17/2024.         PHYSICAL EXAMINATION:    The patient generally appears in good health, is appropriately interactive, and is in no apparent distress.    Skin: No lesions.    Mental: Cooperative with normal affect.    Neuro: Grossly intact.    HEENT: Normal. No evidence of lymphadenopathy.    Chest:  normal inspiratory effort.    Abdomen: Soft, non-tender. No masses or organomegaly. Bladder is not palpable. No evidence of flank discomfort. No evidence of inguinal hernia.    Extremities: No clubbing, cyanosis, or edema    NOTE:  the exam was carried out with a nurse chaperone present  Normal external female genitalia  Urethral meatus is normal  Urethra and bladder are nontender to bimanual exam  Well supported posteriorly   Stage II cystocele  Uterus and cervix are surgically absent  No adnexal masses    Aa:   0 Ba:  0 C:  -6   Gh:  3.5 Pb:  2.5 TVL:  7.5   Ap:  -3 Bp:   -3 D:  N/A       LABS:    Lab Results   Component Value Date    BUN 13 12/08/2024    CREATININE 0.59 12/08/2024       UA 1.015, pH 7, tr leuk, tr blood otherwise, negative.  (On microscopy there were no blood)      IMPRESSION:    Assessment & Plan    IMPRESSION:  - Assessed patient's recurrent UTIs and recent cystoscopy follow-up  - Evaluated bladder emptying and bowel habits  - Considered antibiotic stewardship principles in management of recurrent UTIs  - Determined vaginal estrogen cream appropriate for preventing bladder infections, despite patient's family history of breast cancer  - Performed pelvic exam to assess cystocele  - Will trial estrogen cream before considering surgical intervention for cystocele    URINARY TRACT INFECTION:  - Explained importance of antibiotic stewardship and risks of antibiotic resistance.  -  Continued methenamine: No changes to current regimen.  - Recommend Estrace cream  - OK to resume swimming    CYSTOCELE:  - Pelvic exam performed.  - Post-void residual measurement: 80 mL.    MENOPAUSAL AND FEMALE CLIMACTERIC STATES:  - Discussed misconceptions about vaginal estrogen use in patients with history of stroke or breast cancer.  - Started Estrace cream: Use applicator to apply vaginally 3 times per week before bedtime.    GENERAL HEALTH RECOMMENDATIONS:  - Explained benefits of exercise, particularly swimming, for overall health.  - Ms. Roger to resume swimming class.    FOLLOW-UP:  - Follow up in 3 months to assess effectiveness of estrogen cream and reevaluate cystocele.           This note was generated with the assistance of ambient listening technology. Verbal consent was obtained by the patient and accompanying visitor(s) for the recording of patient appointment to facilitate this note. I attest to having reviewed and edited the generated note for accuracy, though some syntax or spelling errors may persist. Please contact the author of this note for any clarification.

## 2024-12-18 PROBLEM — I70.0 AORTIC ATHEROSCLEROSIS: Status: ACTIVE | Noted: 2024-12-18

## 2024-12-23 RX ORDER — ESTRADIOL 0.1 MG/G
1 CREAM VAGINAL
Qty: 42.5 G | Refills: 3 | Status: SHIPPED | OUTPATIENT
Start: 2024-12-25 | End: 2025-12-25

## 2025-03-18 ENCOUNTER — OFFICE VISIT (OUTPATIENT)
Dept: UROLOGY | Facility: CLINIC | Age: 75
End: 2025-03-18
Payer: MEDICARE

## 2025-03-18 VITALS
HEART RATE: 75 BPM | DIASTOLIC BLOOD PRESSURE: 84 MMHG | SYSTOLIC BLOOD PRESSURE: 155 MMHG | WEIGHT: 166.25 LBS | BODY MASS INDEX: 30.4 KG/M2

## 2025-03-18 DIAGNOSIS — N95.8 GENITOURINARY SYNDROME OF MENOPAUSE: ICD-10-CM

## 2025-03-18 DIAGNOSIS — Z09 STATUS POST UROLOGICAL SURGERY, FOLLOW-UP EXAM: Primary | ICD-10-CM

## 2025-03-18 PROCEDURE — 81002 URINALYSIS NONAUTO W/O SCOPE: CPT | Mod: PBBFAC | Performed by: UROLOGY

## 2025-03-18 PROCEDURE — 99999 PR PBB SHADOW E&M-EST. PATIENT-LVL III: CPT | Mod: PBBFAC,,, | Performed by: UROLOGY

## 2025-03-18 PROCEDURE — 99999PBSHW PR PBB SHADOW TECHNICAL ONLY FILED TO HB: Mod: PBBFAC,,,

## 2025-03-18 PROCEDURE — 99213 OFFICE O/P EST LOW 20 MIN: CPT | Mod: PBBFAC | Performed by: UROLOGY

## 2025-03-18 PROCEDURE — 99214 OFFICE O/P EST MOD 30 MIN: CPT | Mod: S$PBB,,, | Performed by: UROLOGY

## 2025-03-18 PROCEDURE — G2211 COMPLEX E/M VISIT ADD ON: HCPCS | Mod: S$PBB,,, | Performed by: UROLOGY

## 2025-03-18 RX ORDER — ESTRADIOL 0.1 MG/G
1 CREAM VAGINAL
Qty: 42.5 G | Refills: 3 | Status: SHIPPED | OUTPATIENT
Start: 2025-03-19 | End: 2026-03-19

## 2025-03-18 NOTE — PROGRESS NOTES
History of Present Illness    CHIEF COMPLAINT:  Ms. Roger presents today for post-operative follow up after anterior colporrhaphy and sacrospinous ligament fixation, Genitourinary syndrome of menopause (GSM)    POST-OPERATIVE SYMPTOMS:  She reports experiencing vaginal bleeding occurring approximately once weekly, increased from previous monthly frequency. The bleeding is minimal, described as equivalent to a single eyedropper of blood, and is not associated with pain.  She does not notice any blood in the toilet bowl.  She did not get the estrace cream.  May have been a miscommunication between [email or web address] [facility]  And the patient.     OPHTHALMOLOGIC HISTORY:  She recently underwent cataract surgery in one eye with improved visual clarity and color perception. She is scheduled for cataract surgery on the contralateral eye next week.      ROS:  General: -fever, -chills, -fatigue, -weight gain, -weight loss  Eyes: +vision changes, -redness, -discharge  ENT: -ear pain, -nasal congestion, -sore throat  Cardiovascular: -chest pain, -palpitations, -lower extremity edema  Respiratory: -cough, -shortness of breath  Gastrointestinal: -abdominal pain, -nausea, -vomiting, -diarrhea, -constipation, -blood in stool  Genitourinary: -dysuria, -hematuria, -frequency  Musculoskeletal: -joint pain, -muscle pain  Skin: -rash, -lesion  Neurological: -headache, -dizziness, -numbness, -tingling  Psychiatric: -anxiety, -depression, -sleep difficulty  Female Genitourinary: +excessive vaginal bleeding             Past Medical History:   Diagnosis Date    a Coronary Artery Disease     Dr. Pepe Lisa; 5/31/17 LHC/Angiogram = (See Report); On Medical Management Only    a Mild-To-Moderate Bilateral Carotid Artery Stenosis     Will Repeat A Carotid U/S In 2-3 Years; 2/29/24 Bilateral Carotid AA U/S = Mild To Moderate BICA Disease (See Report)    a Patent Foramen Ovale With Atrial Septal Aneurym ####    Dr. Cantu  "Maria L; Dr. Jackelin Montes; In 2016 Her Insurance Denied Coverage For Her Recommended PFO Closure Procedure    b Hypertension     1/18/19 Increased Losartan 12.5 Mg To 25 Mg Daily    c Hypercholesterolemia     d Type 2 Diabetes Mellitus     12/13/19 Referred To Dr. Radha Dietz; 6/10/19 Increased Metformin-XR To 750 Mg BID; On Metformin- Mg BID And Amaryl 2 Mg BID    e Hypothyroidism     f Osteopenia (Improved From Previous Osteoporosis)     On Fosamax 70 Mg qWeek, And OTC D3 2K IU Daily, And OTC CaCO3 500 Mg BID    i SANAM With Hypersomnia     Dr. Chari Caal; Patient Prefers A Dental Appliance    j H/O Adenomatous Colon Polyp On Previous Colonoscopy ###    Dr. Donn Lopez (After Her 8/19/19 TC Was Free Of Polyps): "Repeat TC In 5 Yrs"    j Positive Cologuard Test 03/18/2024    3/18/24 Referred To Dr. Donn alaniz Frequent UTIs     k Nocturia     09/2014 Referred To Dr. Kyler Eller     l Bilateral Plantar Fasciitis     Dr. Farrar "Dax" Jayne Last Saw Her On 1/4/17    l Hallux Limitus Right Foot > Left Foot     Dr. Charan Godoy (Jay) Last Saw Her On 1/4/17    l Left Shoulder Arthropathy     l Left Thumb Base Osteoarthritis     8/18/22 Referred To Dr. Jhonathan Nance; 8/18/22 RXd Voltaren Topical    l Right Shoulder Pain With Inflammation On 11/14 MRI     Dr. Jess Fox; Received PT For This 11/2014    m Bilateral Leg Paresthesias     Dr. Jackelin Montes; 1/22/19 Brain MRI W/O Contrast = (See Report); 2/25/19 Brain And Neck MRA = Unremarkable (See Reports)    m Chronic Fatigue     10/17/16 CBC, TFTs (On Synthroid), Vitamin B12 Level, And Vitamin D3 Level = Normal;  imporving    m H/O CVA From PFO 04/2014 With No Sequelae ###    Dr. Jackelin Montes; Dr. Naranjo (Neurology); In 2016 Her Insurance Denied Coverage For Her Recommended PFO Closure Procedure    m Migraines     Dr. Jackelin Montes; Infrequent    n Chronic Depression     o Recurrent Dizziness Episodes ###    Dr. Jackelin Montes; 1/22/19 Brain MRI W/O Contrast = (See Report); " 2/25/19 Brain And Neck MRA = Unremarkable (See Reports)    o Recurrent sinusitis     1/18/19 Referred To Dr. Perez garcía Early Bilateral Cataracts     Dr. 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 MORAIMA Marroquin    q BLE Varicose Veins     q Borderline Vitamin D Deficiency     12/13/19 RXd OTC D3 2K IU Daily    Wellness Visit 10/28/2024        Past Surgical History:   Procedure Laterality Date    ANTERIOR VAGINAL REPAIR N/A 09/26/2023    Procedure: COLPORRHAPHY, ANTERIOR;  Surgeon: Kandy Reagan MD;  Location: [facility] OR [address] [place] [address];  Service: Urology;  Laterality: N/A;    COLONOSCOPY  10/07/2024    [facility]    CYSTOSCOPY N/A 09/26/2023    Procedure: CYSTOSCOPY;  Surgeon: Kandy Reagan MD;  Location: [facility] OR [address] [place] [address];  Service: Urology;  Laterality: N/A;    DILATION AND CURETTAGE OF UTERUS      HYSTERECTOMY      OOPHORECTOMY      REPAIR OF SACROSPINOUS LIGAMENT Right 09/26/2023    Procedure: REPAIR, LIGAMENT, SACROSPINOUS;  Surgeon: Kandy Reagan MD;  Location: [facility] OR [address] [place] [address];  Service: Urology;  Laterality: Right;    TONSILLECTOMY         Family History   Problem Relation Name Age of Onset    Diabetes Mother      Blindness Mother      Endocrine tumor Mother      Lung cancer Father      Brain cancer Father      Anxiety disorder Sister      Deafness Sister      Valvular heart disease Sister      Bladder Cancer Sister      Deafness Brother      Depression Other      Diabetes Other      Thyroid disease Other      Lung cancer Other         Social History[1]    Allergies:  Codeine; Gloves, latex with aloe vera; Plavix [clopidogrel]; Ramipril; and Boniva [ibandronate]    Medications:  Encounter Medications[2]      PHYSICAL EXAMINATION:    The patient generally appears in good health, is appropriately interactive, and is in no apparent distress.    Skin: No lesions.    Mental: Cooperative with normal affect.    Neuro: Grossly intact.    HEENT: Normal. No evidence of  lymphadenopathy.    Chest:  normal inspiratory effort.    Abdomen: Soft, non-tender. No masses or organomegaly. Bladder is not palpable. No evidence of flank discomfort. No evidence of inguinal hernia.    Extremities: No clubbing, cyanosis, or edema    NOTE:  the exam was carried out with a nurse chaperone present  Normal external female genitalia  Urethral meatus is normal  Urethra and bladder are nontender to bimanual exam  Well supported posteriorly  Anteriorly, there is a recurrence of the cystocele, +1 Aa Ba, this is due to the apex falling.  Reduction of the apex reduces the cystocele.    Uterus and cervix are surgically absent  No adnexal masses    LABS:    Lab Results   Component Value Date    BUN 18 12/26/2024    CREATININE 0.67 12/26/2024       UA 1.010, pH 7, tr leuk, otherwise, negative.       Assessment & Plan    IMPRESSION:  - Assessed s/p anterior colporrhaphy and sacrospinous ligament fixation performed on September 26, 2023.  - Evaluated reported intermittent vaginal spotting, initially occurring monthly and now weekly.    GENITOURINARY SYNDROME OF MENOPAUSE:  - Started estrogen cream as prescribed for potential vaginal atrophy.  - Generic Estrace was reordered.      FOLLOW-UP:  - Follow up in 6 months.       I spent a total of 30 minutes on the day of the visit.  This includes face to face time and non-face to face time preparing to see the patient (eg, review of tests), obtaining and/or reviewing separately obtained history, documenting clinical information in the electronic or other health record, independently interpreting results and communicating results to the patient/family/caregiver, or care coordinator.    Visit complexity today is associated with medical care services that are part of the ongoing care related to the single serious and/or complex condition of Genitourinary syndrome of menopause (GSM). A longitudinal relationship exists or is being developed between the patient and this  practitioner for the care of this condition.       This note was generated with the assistance of ambient listening technology. Verbal consent was obtained by the patient and accompanying visitor(s) for the recording of patient appointment to facilitate this note. I attest to having reviewed and edited the generated note for accuracy, though some syntax or spelling errors may persist. Please contact the author of this note for any clarification.             [1]   Social History  Socioeconomic History    Marital status:      Spouse name: Chinmay    Number of children: 1   Occupational History    Occupation: Sales   Tobacco Use    Smoking status: Never     Passive exposure: Never    Smokeless tobacco: Never   Substance and Sexual Activity    Alcohol use: Yes     Alcohol/week: 1.0 standard drink of alcohol     Types: 1 Glasses of wine per week     Comment: occasionally    Drug use: No    Sexual activity: Not Currently     Partners: Male     Social Drivers of Health     Financial Resource Strain: Low Risk  (10/28/2024)    Overall Financial Resource Strain (CARDIA)     Difficulty of Paying Living Expenses: Not hard at all   Food Insecurity: No Food Insecurity (10/28/2024)    Hunger Vital Sign     Worried About Running Out of Food in the Last Year: Never true     Ran Out of Food in the Last Year: Never true   Transportation Needs: No Transportation Needs (10/28/2024)    PRAPARE - Transportation     Lack of Transportation (Medical): No     Lack of Transportation (Non-Medical): No   Physical Activity: Sufficiently Active (10/28/2024)    Exercise Vital Sign     Days of Exercise per Week: 4 days     Minutes of Exercise per Session: 60 min   Stress: No Stress Concern Present (10/28/2024)    [employer] - Occupational Stress Questionnaire     Feeling of Stress : Only a little   Housing Stability: Unknown (10/28/2024)    Housing Stability Vital Sign     Unable to Pay for Housing in the Last  Year: No     Homeless in the Last Year: No   [2]   Outpatient Encounter Medications as of 3/18/2025   Medication Sig Dispense Refill    aspirin (ECOTRIN) 325 MG EC tablet Take 1 tablet (325 mg total) by mouth once daily.      atorvastatin (LIPITOR) 40 MG tablet TAKE 1 TABLET BY MOUTH ONCE DAILY IN THE EVENING 90 tablet 0    blood sugar diagnostic (CONTOUR NEXT TEST STRIPS) Strp USE 1 STRIP TO CHECK GLUCOSE BEFORE BREAKFAST 100 each 3    calcium-vitamin D3 (OS-JAYLIN 500 + D3) 500 mg(1,250mg) -200 unit per tablet Take 1 tablet by mouth once daily. OSCAL 500/200 D-3 500-200 MG-UNIT TABS      cholecalciferol, vitamin D3, (VITAMIN D3) 2,000 unit Tab Take 1 tablet (2,000 Units total) by mouth once daily.      diabetic supplies, [employer]. Misc MICROLET LANCETS MISC      [START ON 3/19/2025] estradioL (ESTRACE) 0.01 % (0.1 mg/gram) vaginal cream Place 1 g vaginally 3 (three) times a week. 42.5 g 3    FOLIC ACID/MULTIVIT-MIN/LUTEIN (CENTRUM SILVER ORAL) Take 1 tablet by mouth nightly.       glimepiride (AMARYL) 2 MG tablet TAKE 1 TABLET BY MOUTH TWICE DAILY WITH MEALS 180 tablet 0    glimepiride (AMARYL) 2 MG tablet Take 1 tablet (2 mg total) by mouth 2 (two) times daily with meals. 180 tablet 0    GLUC HCL/GLUC HUGHES/WX-OWE-R-GLUC (GLUCOSAMINE COMPLEX ORAL) Take 1 tablet by mouth once daily at 6am.       lancets (MICROLET LANCET) Misc USE 1  TO CHECK GLUCOSE TWICE DAILY 100 each 3    levothyroxine (SYNTHROID) 75 MCG tablet Take 1 tablet (75 mcg total) by mouth before breakfast. 90 tablet 3    losartan (COZAAR) 100 MG tablet Take 1 tablet (100 mg total) by mouth once daily. 90 tablet 3    magnesium 30 mg Tab Take 1 capsule by mouth nightly. MAGNESIUM TABS      meclizine (ANTIVERT) 25 mg tablet Take 1 tablet (25 mg total) by mouth 3 (three) times daily as needed for Dizziness. 30 tablet 0    meloxicam (MOBIC) 7.5 MG tablet Take 7.5 mg by mouth once daily.      metFORMIN (GLUCOPHAGE-XR) 750 MG ER 24hr tablet Take 1 tablet (750 mg  total) by mouth 2 (two) times daily. 180 tablet 3    methenamine (HIPREX) 1 gram Tab Take 1/2 tablet twice a day 90 tablet 3    mupirocin (BACTROBAN) 2 % ointment Apply topically 2 (two) times daily as needed. 30 g 0    pantoprazole (PROTONIX) 20 MG tablet Take 20 mg by mouth once daily.      phenazopyridine (PYRIDIUM) 200 MG tablet Take 200 mg by mouth 3 (three) times daily as needed for Pain.      triamcinolone acetonide 0.1% (KENALOG) 0.1 % cream Apply topically 2 (two) times daily as needed.      [DISCONTINUED] estradioL (ESTRACE) 0.01 % (0.1 mg/gram) vaginal cream Place 1 g vaginally 3 (three) times a week. 42.5 g 3    [DISCONTINUED] levothyroxine (SYNTHROID) 75 MCG tablet Take 1 tablet (75 mcg total) by mouth before breakfast. 90 tablet 3    [DISCONTINUED] losartan (COZAAR) 100 MG tablet Take 1 tablet (100 mg total) by mouth once daily. 90 tablet 3    [DISCONTINUED] metFORMIN (GLUCOPHAGE-XR) 750 MG ER 24hr tablet Take 1 tablet (750 mg total) by mouth 2 (two) times daily. 180 tablet 3    [DISCONTINUED] methenamine (HIPREX) 1 gram Tab Take 1/2 tablet twice a day 90 tablet 3     No facility-administered encounter medications on file as of 3/18/2025.

## (undated) DEVICE — CUP MEDICINE GRADUATED 1OZ

## (undated) DEVICE — DEVICE CAPIO SLIM SUTURE CAPT

## (undated) DEVICE — SOL IRR WATER STRL 3000 ML

## (undated) DEVICE — TUBING SUC UNIV W/CONN 12FT

## (undated) DEVICE — SOL NACL 0.9% INJ PF/100 150

## (undated) DEVICE — BANDAGE KERLIX AMD

## (undated) DEVICE — SYRINGE 0.9% NACL 10MIL PREFIL

## (undated) DEVICE — TRAY CATH FOL SIL URIMTR 16FR

## (undated) DEVICE — NDL HYPO REG 25G X 1 1/2

## (undated) DEVICE — SYR 10CC LUER LOCK

## (undated) DEVICE — SET IRR URLGY 2LINE UNIV SPIKE

## (undated) DEVICE — RETRACTOR STERILE PLASTIC G2

## (undated) DEVICE — GOWN SURGICAL X-LARGE

## (undated) DEVICE — SUT COATED VICRYL 4/0 27IN

## (undated) DEVICE — SUT VICRYL 3-0 27 SH

## (undated) DEVICE — TOWEL OR DISP STRL BLUE 4/PK

## (undated) DEVICE — LUBRICANT SURGILUBE 2 OZ

## (undated) DEVICE — SUT 3-0 VICRYL SH CR/8 18

## (undated) DEVICE — SPONGE DERMACEA GAUZE 4X4

## (undated) DEVICE — HOOK STAY ELAS 5MM 8EA/PK

## (undated) DEVICE — SUT 2-0 VICRYL / SH (J417)

## (undated) DEVICE — TRAY MINOR GEN SURG OMC

## (undated) DEVICE — DRAPE STERI INSTRUMENT 1018

## (undated) DEVICE — JELLY KY LUBRICATING 5G PACKET

## (undated) DEVICE — PACK LAPSCP/PELVSCPY III TIBRN

## (undated) DEVICE — SUT CTD VICRYL 3-0 V.L BR

## (undated) DEVICE — SUT CTD VICRYL VIL BR CR/SH

## (undated) DEVICE — PACK PERI/GYN OPTIMA

## (undated) DEVICE — SUT 2/0 30IN SILK BLK BRAI

## (undated) DEVICE — TRAY SKIN SCRUB WET PREMIUM

## (undated) DEVICE — DRAPE UINDERBUT GRAD PCH

## (undated) DEVICE — ELECTRODE REM PLYHSV RETURN 9